# Patient Record
Sex: FEMALE | Race: WHITE | NOT HISPANIC OR LATINO | Employment: UNEMPLOYED | URBAN - NONMETROPOLITAN AREA
[De-identification: names, ages, dates, MRNs, and addresses within clinical notes are randomized per-mention and may not be internally consistent; named-entity substitution may affect disease eponyms.]

---

## 2019-05-13 ENCOUNTER — TELEPHONE (OUTPATIENT)
Dept: ENDOCRINOLOGY | Facility: CLINIC | Age: 29
End: 2019-05-13

## 2019-05-13 ENCOUNTER — OFFICE VISIT (OUTPATIENT)
Dept: ENDOCRINOLOGY | Facility: CLINIC | Age: 29
End: 2019-05-13

## 2019-05-13 ENCOUNTER — APPOINTMENT (OUTPATIENT)
Dept: LAB | Facility: HOSPITAL | Age: 29
End: 2019-05-13

## 2019-05-13 VITALS
HEART RATE: 88 BPM | HEIGHT: 66 IN | SYSTOLIC BLOOD PRESSURE: 116 MMHG | BODY MASS INDEX: 21.05 KG/M2 | WEIGHT: 131 LBS | DIASTOLIC BLOOD PRESSURE: 78 MMHG

## 2019-05-13 DIAGNOSIS — F17.200 SMOKING: ICD-10-CM

## 2019-05-13 DIAGNOSIS — R73.9 HYPERGLYCEMIA: ICD-10-CM

## 2019-05-13 DIAGNOSIS — R30.0 DYSURIA: ICD-10-CM

## 2019-05-13 DIAGNOSIS — IMO0002 DIABETES MELLITUS TYPE 2, UNCONTROLLED, WITH COMPLICATIONS: Primary | ICD-10-CM

## 2019-05-13 LAB
BACTERIA UR QL AUTO: ABNORMAL /HPF
BASOPHILS # BLD AUTO: 0.09 10*3/MM3 (ref 0–0.2)
BASOPHILS NFR BLD AUTO: 1.1 % (ref 0–1.5)
BILIRUB UR QL STRIP: NEGATIVE
CLARITY UR: ABNORMAL
COLOR UR: YELLOW
DEPRECATED RDW RBC AUTO: 37.3 FL (ref 37–54)
EOSINOPHIL # BLD AUTO: 0.29 10*3/MM3 (ref 0–0.4)
EOSINOPHIL NFR BLD AUTO: 3.5 % (ref 0.3–6.2)
ERYTHROCYTE [DISTWIDTH] IN BLOOD BY AUTOMATED COUNT: 11.6 % (ref 12.3–15.4)
GLUCOSE BLDC GLUCOMTR-MCNC: 176 MG/DL (ref 70–130)
GLUCOSE UR STRIP-MCNC: ABNORMAL MG/DL
HBA1C MFR BLD: 9.46 % (ref 4.8–5.6)
HCT VFR BLD AUTO: 44.6 % (ref 34–46.6)
HGB BLD-MCNC: 15.1 G/DL (ref 12–15.9)
HGB UR QL STRIP.AUTO: NEGATIVE
HYALINE CASTS UR QL AUTO: ABNORMAL /LPF
IMM GRANULOCYTES # BLD AUTO: 0.03 10*3/MM3 (ref 0–0.05)
IMM GRANULOCYTES NFR BLD AUTO: 0.4 % (ref 0–0.5)
KETONES UR QL STRIP: NEGATIVE
LEUKOCYTE ESTERASE UR QL STRIP.AUTO: ABNORMAL
LYMPHOCYTES # BLD AUTO: 2.24 10*3/MM3 (ref 0.7–3.1)
LYMPHOCYTES NFR BLD AUTO: 26.7 % (ref 19.6–45.3)
MCH RBC QN AUTO: 29.5 PG (ref 26.6–33)
MCHC RBC AUTO-ENTMCNC: 33.9 G/DL (ref 31.5–35.7)
MCV RBC AUTO: 87.3 FL (ref 79–97)
MONOCYTES # BLD AUTO: 0.56 10*3/MM3 (ref 0.1–0.9)
MONOCYTES NFR BLD AUTO: 6.7 % (ref 5–12)
NEUTROPHILS # BLD AUTO: 5.17 10*3/MM3 (ref 1.7–7)
NEUTROPHILS NFR BLD AUTO: 61.6 % (ref 42.7–76)
NITRITE UR QL STRIP: POSITIVE
NRBC BLD AUTO-RTO: 0 /100 WBC (ref 0–0.2)
PH UR STRIP.AUTO: 6 [PH] (ref 5–8)
PLATELET # BLD AUTO: 299 10*3/MM3 (ref 140–450)
PMV BLD AUTO: 12.3 FL (ref 6–12)
PROT UR QL STRIP: NEGATIVE
RBC # BLD AUTO: 5.11 10*6/MM3 (ref 3.77–5.28)
RBC # UR: ABNORMAL /HPF
REF LAB TEST METHOD: ABNORMAL
SP GR UR STRIP: 1.02 (ref 1–1.03)
SQUAMOUS #/AREA URNS HPF: ABNORMAL /HPF
UROBILINOGEN UR QL STRIP: ABNORMAL
WBC NRBC COR # BLD: 8.38 10*3/MM3 (ref 3.4–10.8)
WBC UR QL AUTO: ABNORMAL /HPF

## 2019-05-13 PROCEDURE — 36415 COLL VENOUS BLD VENIPUNCTURE: CPT | Performed by: NURSE PRACTITIONER

## 2019-05-13 PROCEDURE — 83525 ASSAY OF INSULIN: CPT | Performed by: NURSE PRACTITIONER

## 2019-05-13 PROCEDURE — 85025 COMPLETE CBC W/AUTO DIFF WBC: CPT | Performed by: NURSE PRACTITIONER

## 2019-05-13 PROCEDURE — 95250 CONT GLUC MNTR PHYS/QHP EQP: CPT | Performed by: NURSE PRACTITIONER

## 2019-05-13 PROCEDURE — 87077 CULTURE AEROBIC IDENTIFY: CPT | Performed by: NURSE PRACTITIONER

## 2019-05-13 PROCEDURE — 83036 HEMOGLOBIN GLYCOSYLATED A1C: CPT | Performed by: NURSE PRACTITIONER

## 2019-05-13 PROCEDURE — 87186 SC STD MICRODIL/AGAR DIL: CPT | Performed by: NURSE PRACTITIONER

## 2019-05-13 PROCEDURE — 81001 URINALYSIS AUTO W/SCOPE: CPT | Performed by: NURSE PRACTITIONER

## 2019-05-13 PROCEDURE — 87086 URINE CULTURE/COLONY COUNT: CPT | Performed by: NURSE PRACTITIONER

## 2019-05-13 PROCEDURE — 84206 ASSAY OF PROINSULIN: CPT | Performed by: NURSE PRACTITIONER

## 2019-05-13 PROCEDURE — 99204 OFFICE O/P NEW MOD 45 MIN: CPT | Performed by: NURSE PRACTITIONER

## 2019-05-13 RX ORDER — ALBUTEROL SULFATE 90 UG/1
AEROSOL, METERED RESPIRATORY (INHALATION) AS NEEDED
COMMUNITY
Start: 2019-05-03 | End: 2020-09-03 | Stop reason: SDUPTHER

## 2019-05-13 RX ORDER — GABAPENTIN 400 MG/1
CAPSULE ORAL AS NEEDED
COMMUNITY
Start: 2019-02-12 | End: 2022-05-04

## 2019-05-13 RX ORDER — FLUOXETINE HYDROCHLORIDE 20 MG/1
20 CAPSULE ORAL DAILY
Refills: 3 | COMMUNITY
Start: 2019-03-15 | End: 2021-10-11

## 2019-05-13 RX ORDER — DULAGLUTIDE 1.5 MG/.5ML
INJECTION, SOLUTION SUBCUTANEOUS
Refills: 1 | COMMUNITY
Start: 2019-04-17 | End: 2020-09-03 | Stop reason: SDUPTHER

## 2019-05-13 RX ORDER — INSULIN GLARGINE 100 [IU]/ML
INJECTION, SOLUTION SUBCUTANEOUS
COMMUNITY
Start: 2019-02-04 | End: 2019-05-28 | Stop reason: SDUPTHER

## 2019-05-13 NOTE — PROGRESS NOTES
Referring provider LILLIE Hopkins        Subjective    29 year old female presents for consultation    REASON - Diabetes Mellitus type 2 uncontrolled diagnosed in 2017      HPI      Duration/Timing -Diabetes Mellitus type 2 diagnosed in 2017/  Presented       Quality -not controlled       Severity - moderate       Severity (Complication/Hospitalizations)        Secondary Macrovascular - no CVA, no PAD, no CAD        Secondary Microvascular--- neuropathy      Context-- presented to ER for fatigue, difficulty waking up and blood sugar was over 700         Diabetes Regimen        Metformin bid    trulicity once weekly      basaglar     Novolog    Quantity      Last HgbA1c 10.2% from Jan. 2019       Blood Glucose Readings    Checks sugars 6 times daily and has been for the last 90 days        220 and above     She did have a a low after giving the fast acting insulin - low of 40        Diet-   Regular diet     Drinking regular sodas       Associated Signs/Symptoms       Hyperglycemic Symptoms: dry mouth       Hypoglycemic Episodes: after fast acting insulin, sweating, nausea      Review of Systems  Review of Systems   Constitutional: Positive for fatigue. Negative for activity change, appetite change, chills, diaphoresis, unexpected weight gain and unexpected weight loss.   HENT: Negative for congestion, dental problem, drooling, ear discharge, sore throat, tinnitus, trouble swallowing and voice change.    Eyes: Negative for blurred vision, double vision, photophobia, pain, discharge, redness, itching and visual disturbance.   Respiratory: Negative for apnea, cough, choking, chest tightness and shortness of breath.    Cardiovascular: Negative for chest pain, palpitations and leg swelling.   Gastrointestinal: Negative for abdominal distention, abdominal pain, blood in stool, constipation, diarrhea, nausea and vomiting.   Endocrine: Negative for cold intolerance, heat intolerance, polydipsia, polyphagia and  polyuria.   Genitourinary: Negative for decreased libido, decreased urine volume, difficulty urinating, dysuria, frequency and urgency.   Musculoskeletal: Negative for arthralgias, back pain, gait problem, joint swelling, myalgias, neck pain, neck stiffness and bursitis.   Skin: Negative for color change, dry skin, pallor, rash and bruise.   Allergic/Immunologic: Negative for environmental allergies, food allergies and immunocompromised state.   Neurological: Negative for dizziness, tremors, syncope, facial asymmetry, weakness, numbness, memory problem and confusion.   Hematological: Negative for adenopathy. Does not bruise/bleed easily.   Psychiatric/Behavioral: Negative for agitation, behavioral problems, decreased concentration, sleep disturbance, suicidal ideas, depressed mood and stress. The patient is not nervous/anxious.      Wt Readings from Last 3 Encounters:   05/13/19 59.4 kg (131 lb)   09/08/16 72.1 kg (159 lb)     Temp Readings from Last 3 Encounters:   No data found for Temp     BP Readings from Last 3 Encounters:   05/13/19 116/78   09/08/16 102/70     Pulse Readings from Last 3 Encounters:   05/13/19 88         Physical Exam  Physical Exam   Constitutional: She is oriented to person, place, and time. She appears well-developed and well-nourished. No distress.   HENT:   Head: Normocephalic and atraumatic.   Right Ear: External ear normal.   Left Ear: External ear normal.   Nose: Nose normal.   Eyes: Conjunctivae and EOM are normal. Pupils are equal, round, and reactive to light.   Neck: Normal range of motion. Neck supple. No tracheal deviation present. No thyromegaly present.   Cardiovascular: Normal rate, regular rhythm and normal heart sounds.   No murmur heard.  Pulmonary/Chest: Effort normal and breath sounds normal. No respiratory distress. She has no wheezes.   Abdominal: Soft. Bowel sounds are normal. There is no tenderness. There is no rebound and no guarding.   Musculoskeletal: Normal range  of motion. She exhibits no edema, tenderness or deformity.   Neurological: She is alert and oriented to person, place, and time. No cranial nerve deficit.   Skin: Skin is warm and dry. No rash noted.   Psychiatric: She has a normal mood and affect. Her behavior is normal. Judgment and thought content normal.         Assessment/Plan       Diagnosis Plan   1. Diabetes mellitus type 2, uncontrolled, with complications (CMS/[facility])  CBC & Differential    Comprehensive Metabolic Panel    Hemoglobin A1c    Islet Cell Dysfunction Group 1    Anti-islet Cell Antibody    Glutamic Acid Decarboxylase    POC Glucose Fingerstick    CBC Auto Differential   2. Hyperglycemia  CBC & Differential    Comprehensive Metabolic Panel    Hemoglobin A1c    Islet Cell Dysfunction Group 1    Anti-islet Cell Antibody    Glutamic Acid Decarboxylase    CBC Auto Differential   3. Dysuria  Urinalysis With Culture If Indicated - Urine, Clean Catch   4. Smoking             Glycemic Management    Checking 6 times daily     10.2 % from Jan. 2019       metformin 500 mg two po BID   Trulicity 1.5 mg weekly    Basaglar taking 18 units --keep         Novolog sliding scale---stop      Start novolog as 1 unit per 15 grams of CHO for each meal TID        plus sliding scale 1 per 50 above 150      Check to see if type 1 due to sensitivity to insulin and her weight --- will keep the metformin and Trulicity until labs are back         Uncontrolled diabetes  Hyperglycemia    Insertion of continuous glucose monitor to define patter    The continuous glucose monitor that was inserted is a savannah glucose monitor          approve dexcom       1. Patient is diabetic, insulin dependent  2. He checks his sugars 4 times daily with variability from 50 up to 400  3, requires basal insulin and prandial insulin 3 times daily for a total of 4 injections per day  4. Based on glucose readings we make adjustments to the insulin  5. We have not achieved ideal outcomes with more  information with a continuous  glucose sensor we should be able to do so  6. We see him every 2 to 3 months for diabetes management  7. Patient has hypoglycemia with unawareness  8. Patient has completed diabetes education  9. Patient has day to day variation in mealtime which confounds the degree of insulin dosing with multiple injections unless we have the CGMS that allows us to better  insulin dosing         Lipid Management      Patient is not on a statin       Jan. 2019       Total chol - 178  Tg - 98  HDL - 36  LDL - 122        Blood Pressure Management    bp controlled not on ACEi           Microvascular Complication Monitoring      May 2018       microalbuminuria     Ratio - 33 High           Gabapentin 400 mg     Bone Health      May 2018     Vitamin d 31         Other Diabetes Related Aspects          Thyroid Health      May 2018    TSH - 1.13       Weight Management:      Patient's Body mass index is 21.14 kg/m². BMI is within normal parameters. No follow-up required..        Preventive Care:    Patient is smoking       I advised Ella of the risks of continuing to use tobacco, and I provided her with tobacco cessation educational materials in the After Visit Summary.     During this visit, I spent less than 3  minutes counseling the patient regarding tobacco cessation.      Dysuria     Check u/a today         Return in about 2 weeks (around 5/27/2019) for Recheck.       Labs today and I will call        Records from Mrs. Norwood received and reviewed from 2019  Thank you for this consultation

## 2019-05-14 ENCOUNTER — TELEPHONE (OUTPATIENT)
Dept: ENDOCRINOLOGY | Facility: CLINIC | Age: 29
End: 2019-05-14

## 2019-05-14 DIAGNOSIS — IMO0002 DIABETES MELLITUS TYPE 2, UNCONTROLLED, WITH COMPLICATIONS: Primary | ICD-10-CM

## 2019-05-14 RX ORDER — SULFAMETHOXAZOLE AND TRIMETHOPRIM 800; 160 MG/1; MG/1
1 TABLET ORAL 2 TIMES DAILY
Qty: 10 TABLET | Refills: 0 | Status: SHIPPED | OUTPATIENT
Start: 2019-05-14 | End: 2020-09-03 | Stop reason: SDUPTHER

## 2019-05-14 NOTE — TELEPHONE ENCOUNTER
----- Message from LILLIE Esquivel sent at 5/14/2019  7:54 AM CDT -----  Let her know the urine is positive for infection; I called in an RX for this ((( please call the lab and see if they can add a cpeptide to her blood if not she needs to go to [place] to have drawn)

## 2019-05-15 LAB — BACTERIA SPEC AEROBE CULT: ABNORMAL

## 2019-05-17 ENCOUNTER — APPOINTMENT (OUTPATIENT)
Dept: LAB | Facility: HOSPITAL | Age: 29
End: 2019-05-17

## 2019-05-17 PROCEDURE — 80053 COMPREHEN METABOLIC PANEL: CPT | Performed by: NURSE PRACTITIONER

## 2019-05-17 PROCEDURE — 84681 ASSAY OF C-PEPTIDE: CPT | Performed by: NURSE PRACTITIONER

## 2019-05-17 PROCEDURE — 86341 ISLET CELL ANTIBODY: CPT | Performed by: NURSE PRACTITIONER

## 2019-05-18 LAB
ALBUMIN SERPL-MCNC: 4.7 G/DL (ref 3.5–5.2)
ALBUMIN/GLOB SERPL: 1.8 G/DL
ALP SERPL-CCNC: 73 U/L (ref 39–117)
ALT SERPL W P-5'-P-CCNC: 11 U/L (ref 1–33)
ANION GAP SERPL CALCULATED.3IONS-SCNC: 11.8 MMOL/L
AST SERPL-CCNC: 16 U/L (ref 1–32)
BILIRUB SERPL-MCNC: 0.4 MG/DL (ref 0.2–1.2)
BUN BLD-MCNC: 12 MG/DL (ref 6–20)
BUN/CREAT SERPL: 16.7 (ref 7–25)
CALCIUM SPEC-SCNC: 9.2 MG/DL (ref 8.6–10.5)
CHLORIDE SERPL-SCNC: 105 MMOL/L (ref 98–107)
CO2 SERPL-SCNC: 25.2 MMOL/L (ref 22–29)
CREAT BLD-MCNC: 0.72 MG/DL (ref 0.57–1)
GFR SERPL CREATININE-BSD FRML MDRD: 94 ML/MIN/1.73
GLOBULIN UR ELPH-MCNC: 2.6 GM/DL
GLUCOSE BLD-MCNC: 203 MG/DL (ref 65–99)
POTASSIUM BLD-SCNC: 3.9 MMOL/L (ref 3.5–5.2)
PROT SERPL-MCNC: 7.3 G/DL (ref 6–8.5)
SODIUM BLD-SCNC: 142 MMOL/L (ref 136–145)

## 2019-05-19 LAB
INSULIN SERPL-ACNC: 32 UIU/ML
PROINSULIN SERPL-SCNC: 13 PMOL/L
PROINSULIN/INSULIN SERPL-RTO: 6 %

## 2019-05-20 ENCOUNTER — TELEPHONE (OUTPATIENT)
Dept: ENDOCRINOLOGY | Facility: CLINIC | Age: 29
End: 2019-05-20

## 2019-05-20 LAB — C PEPTIDE SERPL-MCNC: 0.6 NG/ML (ref 1.1–4.4)

## 2019-05-20 NOTE — TELEPHONE ENCOUNTER
----- Message from LILLIE Esquivel sent at 5/20/2019  2:39 PM CDT -----  She is type 1 diabetic keep the 2 insulins but stop the metformin and trulicity

## 2019-05-21 LAB — PANC ISLET CELL AB TITR SER: NEGATIVE {TITER}

## 2019-05-23 LAB — GAD65 AB SER-ACNC: 2607.1 U/ML (ref 0–5)

## 2019-05-28 ENCOUNTER — TELEPHONE (OUTPATIENT)
Dept: ENDOCRINOLOGY | Facility: CLINIC | Age: 29
End: 2019-05-28

## 2019-05-28 ENCOUNTER — OFFICE VISIT (OUTPATIENT)
Dept: ENDOCRINOLOGY | Facility: CLINIC | Age: 29
End: 2019-05-28

## 2019-05-28 DIAGNOSIS — E10.65 TYPE 1 DIABETES MELLITUS WITH HYPERGLYCEMIA (HCC): Primary | ICD-10-CM

## 2019-05-28 PROCEDURE — 95251 CONT GLUC MNTR ANALYSIS I&R: CPT | Performed by: NURSE PRACTITIONER

## 2019-05-28 RX ORDER — INSULIN LISPRO 100 U/ML
INJECTION, SOLUTION SUBCUTANEOUS
Qty: 5 PEN | Refills: 5 | Status: SHIPPED | OUTPATIENT
Start: 2019-05-28 | End: 2019-10-18 | Stop reason: CLARIF

## 2019-05-28 RX ORDER — INSULIN GLARGINE 100 [IU]/ML
INJECTION, SOLUTION SUBCUTANEOUS
Qty: 5 PEN | Refills: 5 | Status: SHIPPED | OUTPATIENT
Start: 2019-05-28 | End: 2021-03-03

## 2019-05-28 NOTE — TELEPHONE ENCOUNTER
----- Message from Cami Candelario RN sent at 5/28/2019 12:04 PM CDT -----  Regarding: Needs precription  Basaglar 20 un. Daily  Novolog 1:10g plus sliding scale 1:50 above 150.    Patient needs both basaglar and novolog prescriptions sent in to [facility] in [place].

## 2019-05-28 NOTE — PROGRESS NOTES
Ella Olivera is a 29 y.o. female seen by diabetes educator 05/28/2019  to remove diagnostic Jason sensor. Completed sensor download. Reviewed with LILLIE Gomez. Avg. Glucose 195. In Target range 41%, above 56%, hypoglycemia 3%. Changed basaglar dose to 20 units daily and carb ratio 1:10 with same sliding scale - see below.     1. Carbohydrate Counting/ Exchange Choices and Healthy Foods   I. Reviewed carbohydrate-containing foods, standard serving sizes, how to measure foods, and reading nutrition labels.   II. Provided patient with carbohydrate counting booklet (Carb counting and meal planning book).   III. Provided patient with list of non-starchy vegetables and foods that are low in carbohydrate for snacks and to incorporate with meals.     IV. Instructed patient to eat 45-60 grams of carbohydrate with each meal. Patient stated trying to gain a little weight so will try to 60 grams.     V. Reviewed the difference between simple and complex carbohydrate. Encouraged patient to choose complex carbohydrates more often.     VI. Choose fruits, vegetables, whole grains, legumes, low-fat milk, fiber-rich foods, minimal saturated fats, and watch cholesterol and sodium intake.    VII. Patient demonstrated understanding by correctly identifying and calculating carbohydrate amounts for various meals.    2. Novolog Dosing   I. 1 units of Humalog for every 15 grams of carbohydrate eaten plus 1 units per 50 mg/dl above 150 sliding scale. Change to 1:10 carb ratio, same sliding scale.    II. Patient demonstrated understanding by calculating correct dosage for example meals and blood sugars   III. Reviewed Novolog is to be given anytime you eat food with carbs in it or for high sugars.     3. Basaglar Dosing   I. 18 units in the morning. Change to 20 units daily. Patient decided better to give at night. Reviewed steps to change times of day.      [facility]    5. Reviewed proper  treatment for hypoglycemia, hyperglycemia, and DKA. Diabetes and You book provided. Reviewed difference between long-acting and fast-acting insulin. Patient mentioned some interest in insulin pump. Review with Trey at upcoming appointment.     Keep follow up appt on 6/24/19.  CAMILLE JasonN, RN  Diabetes Educator

## 2019-05-29 ENCOUNTER — TELEPHONE (OUTPATIENT)
Dept: ENDOCRINOLOGY | Facility: CLINIC | Age: 29
End: 2019-05-29

## 2019-05-29 NOTE — TELEPHONE ENCOUNTER
Pt has got a yeast Infection from  [facility] and now has yeast infections    Pharmacy [place], [place]    [phone]

## 2019-05-30 RX ORDER — FLUCONAZOLE 150 MG/1
150 TABLET ORAL ONCE
Qty: 1 TABLET | Refills: 1 | Status: SHIPPED | OUTPATIENT
Start: 2019-05-30 | End: 2019-05-30

## 2019-06-06 ENCOUNTER — TELEPHONE (OUTPATIENT)
Dept: ENDOCRINOLOGY | Facility: CLINIC | Age: 29
End: 2019-06-06

## 2019-07-01 ENCOUNTER — TELEPHONE (OUTPATIENT)
Dept: FAMILY MEDICINE CLINIC | Facility: CLINIC | Age: 29
End: 2019-07-01

## 2019-07-01 NOTE — TELEPHONE ENCOUNTER
Pt just found out she was pregnant, she has an appt with Trey on 08/07/2019. She is wanting to know if she needs to remove her Dexcom or if it is okay to keep it in. Please call pt, thanks!        Can you please answer this for her.  Thanks. r

## 2019-07-01 NOTE — TELEPHONE ENCOUNTER
Patient confirmed multiple urine pregnancy tests are positive from yesterday and today. Continue to wear dexcom. First trimester hormones may cause low blood sugars - keep close watch on blood sugar levels. Add in 2-3 snacks with pregnancy. Patient mentioned low sugars overnight. Recommended to eat a bedtime snack and not give insulin for that. If that doesn't work change carb ratio at supper from 1:10 to 1:15. Patient will call for an OB appt. Interested in pumps - explained 2 office notes needed for pump approval and only seen NP here once.

## 2019-07-01 NOTE — TELEPHONE ENCOUNTER
Pt just found out she was pregnant, she has an appt with Trey on 08/07/2019. She is wanting to know if she needs to remove her Dexcom or if it is okay to keep it in. Please call pt, thanks!

## 2019-09-17 ENCOUNTER — OFFICE VISIT (OUTPATIENT)
Dept: ENDOCRINOLOGY | Facility: CLINIC | Age: 29
End: 2019-09-17

## 2019-09-17 ENCOUNTER — APPOINTMENT (OUTPATIENT)
Dept: LAB | Facility: HOSPITAL | Age: 29
End: 2019-09-17

## 2019-09-17 VITALS
BODY MASS INDEX: 22.87 KG/M2 | HEART RATE: 66 BPM | WEIGHT: 142.3 LBS | OXYGEN SATURATION: 99 % | HEIGHT: 66 IN | DIASTOLIC BLOOD PRESSURE: 76 MMHG | SYSTOLIC BLOOD PRESSURE: 116 MMHG

## 2019-09-17 DIAGNOSIS — F17.200 SMOKING: ICD-10-CM

## 2019-09-17 DIAGNOSIS — E10.65 TYPE 1 DIABETES MELLITUS WITH HYPERGLYCEMIA (HCC): Primary | ICD-10-CM

## 2019-09-17 PROCEDURE — 99214 OFFICE O/P EST MOD 30 MIN: CPT | Performed by: NURSE PRACTITIONER

## 2019-09-17 PROCEDURE — 83036 HEMOGLOBIN GLYCOSYLATED A1C: CPT | Performed by: NURSE PRACTITIONER

## 2019-09-17 PROCEDURE — 85025 COMPLETE CBC W/AUTO DIFF WBC: CPT | Performed by: NURSE PRACTITIONER

## 2019-09-17 PROCEDURE — 84443 ASSAY THYROID STIM HORMONE: CPT | Performed by: NURSE PRACTITIONER

## 2019-09-17 PROCEDURE — 82306 VITAMIN D 25 HYDROXY: CPT | Performed by: NURSE PRACTITIONER

## 2019-09-17 PROCEDURE — 36415 COLL VENOUS BLD VENIPUNCTURE: CPT | Performed by: NURSE PRACTITIONER

## 2019-09-17 PROCEDURE — 80053 COMPREHEN METABOLIC PANEL: CPT | Performed by: NURSE PRACTITIONER

## 2019-09-17 NOTE — PROGRESS NOTES
Subjective    Ella Olivera is a 29 y.o. female. she is here today for follow-up.    History of Present Illness     29 year old female presents for follow up          REASON - Diabetes Mellitus type 1         HPI        Duration/Timing -Diabetes Mellitus type 1 diagnosed in 2017        Quality -not controlled         Severity - moderate          Severity (Complication/Hospitalizations)        Secondary Macrovascular - no CVA, no PAD, no CAD        Secondary Microvascular--- neuropathy        Context-- presented to ER for fatigue, difficulty waking up and blood sugar was over 700          Diabetes Regimen           Metformin bid     trulicity once weekly        basaglar      Novolog     Quantity      Lab Results   Component Value Date    HGBA1C 9.46 (H) 05/13/2019             Blood Glucose Readings     Checks sugars 6 times daily and has been for the last 90 days     Has the dexcom         Had some soda before visit            Diet-   Regular diet      Drinking regular sodas         Associated Signs/Symptoms       Hyperglycemic Symptoms: dry mouth       Hypoglycemic Episodes: after fast acting insulin, sweating, nausea               The following portions of the patient's history were reviewed and updated as appropriate:   Past Medical History:   Diagnosis Date   • Allergic rhinitis    • Backache    • Cyst of ovary    • Endometriosis     clinical   • Endometriosis of pelvic peritoneum     h/o   • Genital warts     h/o   • Irregular periods    • Left lower quadrant pain    • Pelvic and perineal pain     has bladder tenderness, uterine tenderness, and left adnexal tenderness   • Urethritis    • Urinary tract infectious disease      Past Surgical History:   Procedure Laterality Date   • APPENDECTOMY     • CHOLECYSTECTOMY     • DENTAL PROCEDURE      wisdom teeth extraction   • DIAGNOSTIC LAPAROSCOPY  12/27/2007    exam under anesthesia, diagnostic laparoscopy. lower abdominal pain of uncertain etiology. stage  II endometriosis.   • TONSILLECTOMY  2002    chronic tonsillitis     Family History   Problem Relation Age of Onset   • Diabetes Other    • Hypertension Other      OB History      Para Term  AB Living    6 1   1 5 1    SAB TAB Ectopic Molar Multiple Live Births    5                  Current Outpatient Medications   Medication Sig Dispense Refill   • ADMELOG SOLOSTAR 100 UNIT/ML solution pen-injector Inject 10-12 units per carb intake-carb count each meal 5 pen 5   • FLUoxetine (PROzac) 20 MG capsule Take 20 mg by mouth Daily.  3   • gabapentin (NEURONTIN) 400 MG capsule      • insulin aspart (NOVOLOG FLEXPEN) 100 UNIT/ML solution pen-injector sc pen Inject 10-12 units based on carb count with each meal. 5 pen 5   • Insulin Glargine (BASAGLAR KWIKPEN) 100 UNIT/ML injection pen Inject 20 units per day. 5 pen 5   • metFORMIN (GLUCOPHAGE) 500 MG tablet TAKE 1 TABLET BY MOUTH EVERY DAY WITH MEALS  0   • sulfamethoxazole-trimethoprim (BACTRIM DS) 800-160 MG per tablet Take 1 tablet by mouth 2 (Two) Times a Day. 10 tablet 0   • TRULICITY 1.5 MG/0.5ML solution pen-injector INJECT 1.5 MG (0.5 ML) SUBCUTANEOUSLY ONCE A WEEK AS DIRECTED  1   • VENTOLIN  (90 Base) MCG/ACT inhaler        No current facility-administered medications for this visit.      Allergies   Allergen Reactions   • Aspirin    • Penicillins    • Reglan [Metoclopramide]      Social History     Socioeconomic History   • Marital status:      Spouse name: Not on file   • Number of children: Not on file   • Years of education: Not on file   • Highest education level: Not on file   Tobacco Use   • Smoking status: Current Every Day Smoker   • Smokeless tobacco: Never Used   Substance and Sexual Activity   • Alcohol use: No   • Drug use: Yes     Comment: past use   • Sexual activity: Yes     Partners: Male     Birth control/protection: None       Review of Systems  Review of Systems   Constitutional: Negative for activity change,  "appetite change, diaphoresis and fatigue.   HENT: Negative for facial swelling, sneezing, sore throat, tinnitus, trouble swallowing and voice change.    Eyes: Negative for photophobia, pain, discharge, redness, itching and visual disturbance.   Respiratory: Negative for apnea, cough, choking, chest tightness and shortness of breath.    Cardiovascular: Negative for chest pain, palpitations and leg swelling.   Gastrointestinal: Negative for abdominal distention, abdominal pain, constipation, diarrhea, nausea and vomiting.   Endocrine: Negative for cold intolerance, heat intolerance, polydipsia, polyphagia and polyuria.   Genitourinary: Negative for difficulty urinating, dysuria, frequency, hematuria and urgency.   Musculoskeletal: Negative for arthralgias, back pain, gait problem, joint swelling, myalgias, neck pain and neck stiffness.   Skin: Negative for color change, pallor, rash and wound.   Neurological: Negative for dizziness, tremors, weakness, light-headedness, numbness and headaches.   Hematological: Negative for adenopathy. Does not bruise/bleed easily.   Psychiatric/Behavioral: Negative for behavioral problems, confusion and sleep disturbance.        Objective    /76   Pulse 66   Ht 167.6 cm (66\")   Wt 64.5 kg (142 lb 4.8 oz)   SpO2 99%   BMI 22.97 kg/m²   Physical Exam   Constitutional: She is oriented to person, place, and time. She appears well-developed and well-nourished. No distress.   HENT:   Head: Normocephalic and atraumatic.   Right Ear: External ear normal.   Left Ear: External ear normal.   Nose: Nose normal.   Eyes: Conjunctivae and EOM are normal. Pupils are equal, round, and reactive to light.   Neck: Normal range of motion. Neck supple. No tracheal deviation present. No thyromegaly present.   Cardiovascular: Normal rate, regular rhythm and normal heart sounds.   No murmur heard.  Pulmonary/Chest: Effort normal and breath sounds normal. No respiratory distress. She has no wheezes. "   Abdominal: Soft. Bowel sounds are normal. There is no tenderness. There is no rebound and no guarding.   Musculoskeletal: Normal range of motion. She exhibits no edema, tenderness or deformity.   Neurological: She is alert and oriented to person, place, and time. No cranial nerve deficit.   Skin: Skin is warm and dry. No rash noted.   Psychiatric: She has a normal mood and affect. Her behavior is normal. Judgment and thought content normal.       Lab Review  Glucose (mg/dL)   Date Value   05/17/2019 203 (H)     Sodium (mmol/L)   Date Value   05/17/2019 142     Potassium (mmol/L)   Date Value   05/17/2019 3.9     Chloride (mmol/L)   Date Value   05/17/2019 105     CO2 (mmol/L)   Date Value   05/17/2019 25.2     BUN (mg/dL)   Date Value   05/17/2019 12     Creatinine (mg/dL)   Date Value   05/17/2019 0.72     Hemoglobin A1C (%)   Date Value   05/13/2019 9.46 (H)       Assessment/Plan      1. Type 1 diabetes mellitus with hyperglycemia (CMS/[facility])    2. Smoking    .    Medications prescribed:  Outpatient Encounter Medications as of 9/17/2019   Medication Sig Dispense Refill   • ADMELOG SOLOSTAR 100 UNIT/ML solution pen-injector Inject 10-12 units per carb intake-carb count each meal 5 pen 5   • FLUoxetine (PROzac) 20 MG capsule Take 20 mg by mouth Daily.  3   • gabapentin (NEURONTIN) 400 MG capsule      • insulin aspart (NOVOLOG FLEXPEN) 100 UNIT/ML solution pen-injector sc pen Inject 10-12 units based on carb count with each meal. 5 pen 5   • Insulin Glargine (BASAGLAR KWIKPEN) 100 UNIT/ML injection pen Inject 20 units per day. 5 pen 5   • metFORMIN (GLUCOPHAGE) 500 MG tablet TAKE 1 TABLET BY MOUTH EVERY DAY WITH MEALS  0   • sulfamethoxazole-trimethoprim (BACTRIM DS) 800-160 MG per tablet Take 1 tablet by mouth 2 (Two) Times a Day. 10 tablet 0   • TRULICITY 1.5 MG/0.5ML solution pen-injector INJECT 1.5 MG (0.5 ML) SUBCUTANEOUSLY ONCE A WEEK AS DIRECTED  1   • VENTOLIN  (90 Base) MCG/ACT inhaler        No  facility-administered encounter medications on file as of 9/17/2019.        Orders placed during this encounter include:  Orders Placed This Encounter   Procedures   • TSH   • Hemoglobin A1c   • Comprehensive Metabolic Panel   • Vitamin D 25 Hydroxy   • CBC & Differential     Order Specific Question:   Manual Differential     Answer:   No     Glycemic Management           Lab Results   Component Value Date    HGBA1C 9.46 (H) 05/13/2019          Component      Latest Ref Rng & Units 5/17/2019   Islet Cell Ab      Neg:<1:1 Negative   BRENDA-65      0.0 - 5.0 U/mL 2607.1 (H)   C-Peptide      1.1 - 4.4 ng/mL 0.6 (L)        metformin 500 mg two po BID --- stopped   Trulicity 1.5 mg weekly -- stopped      Basaglar taking 20 units --- decrease to 18 untis            Novolog       1 unit per 10 grams of CHO          plus sliding scale 1 per 50 above 150               Approve for  Insulin pump      #1  Patient has diabetes mellitus, insulin-dependent.     #2 She performs blood glucose testing at least times daily and blood glucose log was brought to office with variability from .     #3  She is injects  Basal insulin  and Prandial Insulin for a total of at least  4 injections per day and has been doing this for at least 6 months      #4 Patient tests blood sugars at least 4 times daily and makes frequent self-adjustments and patient is injecting insulin at least 4 times daily. She has been doing this for more than 6 months . She tests frequently due to hypoglycemia and hyperglycemia.      #5 I have personally seen patient within the past 6 months     #6 We plan on seeing her every 2-3 months for continuous adjustment of her diabetes regimen      #7 patient has hypoglycemia with episodes of unawareness.     #8 patient has day-to-day variation in her mealtime which confounds the degree of insulin dosing with multiple daily injections.     #9 patient has completed diabetes education program with us.     #10 she has  demonstrated the ability to self monitor her glucose.         #11 Patient is motivated in improving  diabetes control              Lipid Management        Patient is not on a statin         Jan. 2019         Total chol - 178  Tg - 98  HDL - 36  LDL - 122           Blood Pressure Management     bp controlled not on ACEi               Microvascular Complication Monitoring        May 2018         microalbuminuria      Ratio - 33 High               Gabapentin 400 mg      Bone Health        May 2018      Vitamin d 31            Other Diabetes Related Aspects              Thyroid Health        May 2018     TSH - 1.13         Weight Management:     Patient's Body mass index is 22.97 kg/m². BMI is within normal parameters. No follow-up required..    Decrease daily caloric intake by 500 calories per day            Preventive Care:     Patient is smoking     Ella Chery Olivera is a current cigarettes user.  She currently smokes 10 pack of cigarettes and cigarettes per day for a duration of 20 years. I have educated her on the risk of diseases from using tobacco products such as cancer, COPD and heart diease.     I advised her to quit and she is not willing to quit.    I spent 3  minutes counseling the patient.            .        4. Follow-up: Return in about 3 months (around 12/17/2019) for Recheck.

## 2019-09-18 LAB
25(OH)D3 SERPL-MCNC: 35.7 NG/ML (ref 30–100)
ALBUMIN SERPL-MCNC: 4.6 G/DL (ref 3.5–5.2)
ALBUMIN/GLOB SERPL: 1.8 G/DL
ALP SERPL-CCNC: 65 U/L (ref 39–117)
ALT SERPL W P-5'-P-CCNC: 13 U/L (ref 1–33)
ANION GAP SERPL CALCULATED.3IONS-SCNC: 11.6 MMOL/L (ref 5–15)
AST SERPL-CCNC: 21 U/L (ref 1–32)
BASOPHILS # BLD AUTO: 0.12 10*3/MM3 (ref 0–0.2)
BASOPHILS NFR BLD AUTO: 0.9 % (ref 0–1.5)
BILIRUB SERPL-MCNC: 0.3 MG/DL (ref 0.2–1.2)
BUN BLD-MCNC: 14 MG/DL (ref 6–20)
BUN/CREAT SERPL: 18.9 (ref 7–25)
CALCIUM SPEC-SCNC: 8.9 MG/DL (ref 8.6–10.5)
CHLORIDE SERPL-SCNC: 102 MMOL/L (ref 98–107)
CO2 SERPL-SCNC: 25.4 MMOL/L (ref 22–29)
CREAT BLD-MCNC: 0.74 MG/DL (ref 0.57–1)
DEPRECATED RDW RBC AUTO: 39.5 FL (ref 37–54)
EOSINOPHIL # BLD AUTO: 0.49 10*3/MM3 (ref 0–0.4)
EOSINOPHIL NFR BLD AUTO: 3.5 % (ref 0.3–6.2)
ERYTHROCYTE [DISTWIDTH] IN BLOOD BY AUTOMATED COUNT: 11.9 % (ref 12.3–15.4)
GFR SERPL CREATININE-BSD FRML MDRD: 92 ML/MIN/1.73
GLOBULIN UR ELPH-MCNC: 2.6 GM/DL
GLUCOSE BLD-MCNC: 182 MG/DL (ref 65–99)
HBA1C MFR BLD: 8.9 % (ref 4.8–5.6)
HCT VFR BLD AUTO: 43.3 % (ref 34–46.6)
HGB BLD-MCNC: 14.2 G/DL (ref 12–15.9)
IMM GRANULOCYTES # BLD AUTO: 0.07 10*3/MM3 (ref 0–0.05)
IMM GRANULOCYTES NFR BLD AUTO: 0.5 % (ref 0–0.5)
LYMPHOCYTES # BLD AUTO: 2.45 10*3/MM3 (ref 0.7–3.1)
LYMPHOCYTES NFR BLD AUTO: 17.7 % (ref 19.6–45.3)
MCH RBC QN AUTO: 29.6 PG (ref 26.6–33)
MCHC RBC AUTO-ENTMCNC: 32.8 G/DL (ref 31.5–35.7)
MCV RBC AUTO: 90.4 FL (ref 79–97)
MONOCYTES # BLD AUTO: 0.58 10*3/MM3 (ref 0.1–0.9)
MONOCYTES NFR BLD AUTO: 4.2 % (ref 5–12)
NEUTROPHILS # BLD AUTO: 10.1 10*3/MM3 (ref 1.7–7)
NEUTROPHILS NFR BLD AUTO: 73.2 % (ref 42.7–76)
NRBC BLD AUTO-RTO: 0 /100 WBC (ref 0–0.2)
PLATELET # BLD AUTO: 361 10*3/MM3 (ref 140–450)
PMV BLD AUTO: 11.4 FL (ref 6–12)
POTASSIUM BLD-SCNC: 4 MMOL/L (ref 3.5–5.2)
PROT SERPL-MCNC: 7.2 G/DL (ref 6–8.5)
RBC # BLD AUTO: 4.79 10*6/MM3 (ref 3.77–5.28)
SODIUM BLD-SCNC: 139 MMOL/L (ref 136–145)
TSH SERPL DL<=0.05 MIU/L-ACNC: 0.75 UIU/ML (ref 0.27–4.2)
WBC NRBC COR # BLD: 13.81 10*3/MM3 (ref 3.4–10.8)

## 2019-09-23 ENCOUNTER — TELEPHONE (OUTPATIENT)
Dept: ENDOCRINOLOGY | Facility: CLINIC | Age: 29
End: 2019-09-23

## 2019-10-18 ENCOUNTER — TELEPHONE (OUTPATIENT)
Dept: FAMILY MEDICINE CLINIC | Facility: CLINIC | Age: 29
End: 2019-10-18

## 2019-10-18 NOTE — TELEPHONE ENCOUNTER
Pt has received her pump. She is needing the insulin called in to [facility] in [place]. Thanks!

## 2019-11-04 ENCOUNTER — TELEPHONE (OUTPATIENT)
Dept: FAMILY MEDICINE CLINIC | Facility: CLINIC | Age: 29
End: 2019-11-04

## 2019-11-04 NOTE — TELEPHONE ENCOUNTER
She said she is having to give more insulin with meals and snacks. She is giving 7 units sometimes and use to not have to give any depending on what it was. She is going to follow up with PCP and get the UTI taken care.

## 2019-11-04 NOTE — TELEPHONE ENCOUNTER
PT CALLED AND STATES THAT SHE IS CONFUSED. SHE STATES THAT HER PHARMACY IS STATING HER INSULIN FOR HER PUMP IS NOT COVERED. SHE STATES THEY TOLD HER THEY HAD SENT SOMETHING TO THE OFFICE LAST WEEK FOR THIS. CAN YOU CHECK AND SEE IF YOU RECEIVED ANYTHING FROM [facility]? CALL HER [phone]    ALSO SHE STATES SHE BELIEVES SHE HAS A UTI AND IT IS CAUSING HER SUGARS TO RUN HIGH. SHE IS ASKING WHAT SHE NEEDS TO DO?

## 2019-11-04 NOTE — TELEPHONE ENCOUNTER
Please check on her insulin and see what is going on     If she has a UTI she needs to go to her doctor or urgent care to give a sample and let them treat her infection    How much insulin is she taking?

## 2019-11-04 NOTE — TELEPHONE ENCOUNTER
Pt called-      (SHE STATES SHE BELIEVES SHE HAS A UTI AND IT IS CAUSING HER SUGARS TO RUN HIGH. SHE IS ASKING WHAT SHE NEEDS TO DO?)

## 2019-11-05 ENCOUNTER — TELEPHONE (OUTPATIENT)
Dept: ENDOCRINOLOGY | Facility: CLINIC | Age: 29
End: 2019-11-05

## 2019-11-05 NOTE — TELEPHONE ENCOUNTER
GENESIS SERRA (Key: [id number])   Rx #: [id number]   NovoLOG 100UNIT/ML solution   Form  [employer] [place] [employer] [facility] Prior Authorization Form   Plan Contact  [phone] phone   [phone] fax   Created   4 days ago   Sent to Plan   1 minute ago  Determination   N/A   Wait for Determination  Please wait for [employer] to return a determination.

## 2019-12-05 ENCOUNTER — TELEPHONE (OUTPATIENT)
Dept: FAMILY MEDICINE CLINIC | Facility: CLINIC | Age: 29
End: 2019-12-05
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
## 2019-12-12 ENCOUNTER — TELEPHONE (OUTPATIENT)
Dept: ENDOCRINOLOGY | Facility: CLINIC | Age: 29
End: 2019-12-12

## 2019-12-13 ENCOUNTER — TELEPHONE (OUTPATIENT)
Dept: ENDOCRINOLOGY | Facility: CLINIC | Age: 29
End: 2019-12-13

## 2019-12-13 ENCOUNTER — TELEPHONE (OUTPATIENT)
Dept: FAMILY MEDICINE CLINIC | Facility: CLINIC | Age: 29
End: 2019-12-13

## 2019-12-13 NOTE — TELEPHONE ENCOUNTER
[employer] Needs to know last visit and insulin dependency and how many times they test. Paper work will be faxed please be sure that the DR signs this will be for diabetic supplies said alll numbers will be included in the fax  Thank You

## 2020-01-07 ENCOUNTER — TELEPHONE (OUTPATIENT)
Dept: FAMILY MEDICINE CLINIC | Facility: CLINIC | Age: 30
End: 2020-01-07

## 2020-01-07 NOTE — TELEPHONE ENCOUNTER
PT NEEDS A CALL BACK. SHE STATES THAT SHE CALLED TO GET HER PUMP SUPPLIES BUT BECAUSE OF HER INSURANCE SHE CAN NOT GET FILLED BEFORE THE 14TH. SHE STATES SHE WILL BE RUNNING OUT BY THEN, STATES SHE HAS INSULIN PENS LEFT SHE CAN USE BUT NEEDS TO KNOW WHAT NICOLETTE OR DR CARRILLO WANTS HER TO DO. CALL HER [phone]

## 2020-02-18 ENCOUNTER — OFFICE VISIT (OUTPATIENT)
Dept: ENDOCRINOLOGY | Facility: CLINIC | Age: 30
End: 2020-02-18

## 2020-02-18 VITALS
HEIGHT: 66 IN | SYSTOLIC BLOOD PRESSURE: 128 MMHG | WEIGHT: 153.8 LBS | HEART RATE: 65 BPM | OXYGEN SATURATION: 99 % | BODY MASS INDEX: 24.72 KG/M2 | DIASTOLIC BLOOD PRESSURE: 76 MMHG

## 2020-02-18 DIAGNOSIS — E55.9 VITAMIN D DEFICIENCY: ICD-10-CM

## 2020-02-18 DIAGNOSIS — F17.200 SMOKING: ICD-10-CM

## 2020-02-18 DIAGNOSIS — E10.65 TYPE 1 DIABETES MELLITUS WITH HYPERGLYCEMIA (HCC): Primary | ICD-10-CM

## 2020-02-18 LAB — HBA1C MFR BLD: 8.5 %

## 2020-02-18 PROCEDURE — 99214 OFFICE O/P EST MOD 30 MIN: CPT | Performed by: NURSE PRACTITIONER

## 2020-02-18 RX ORDER — BUSPIRONE HYDROCHLORIDE 15 MG/1
15 TABLET ORAL AS NEEDED
COMMUNITY
Start: 2019-12-18

## 2020-02-18 NOTE — PROGRESS NOTES
Subjective    Ella Olivera is a 30 y.o. female. she is here today for follow-up.    History of Present Illness       30 year old female presents for follow up            REASON - Diabetes Mellitus type 1         HPI        Duration/Timing -Diabetes Mellitus type 1 diagnosed in 2017        Quality -not controlled         Severity - moderate          Severity (Complication/Hospitalizations)        Secondary Macrovascular - no CVA, no PAD, no CAD        Secondary Microvascular--- neuropathy        Context-- presented to ER for fatigue, difficulty waking up and blood sugar was over 700          Diabetes Regimen           Metformin bid     trulicity once weekly        basaglar      Novolog     Quantity              Lab Results   Component Value Date     HGBA1C 9.46 (H) 05/13/2019               Blood Glucose Readings     Checks sugars 6 times daily and has been for the last 90 days      Has the dexcom     Downloaded and reviewed     Dated from Feb. 5 - Feb. 18, 2020     Average on dexcom 215    32% target    68% above target     0% low       Tandem pump     Average 221             Diet-   Regular diet      Drinking regular sodas         Associated Signs/Symptoms       Hyperglycemic Symptoms: dry mouth       Hypoglycemic Episodes: after fast acting insulin, sweating, nausea               The following portions of the patient's history were reviewed and updated as appropriate:   Past Medical History:   Diagnosis Date   • Allergic rhinitis    • Backache    • Cyst of ovary    • Endometriosis     clinical   • Endometriosis of pelvic peritoneum     h/o   • Genital warts     h/o   • Irregular periods    • Left lower quadrant pain    • Pelvic and perineal pain     has bladder tenderness, uterine tenderness, and left adnexal tenderness   • Urethritis    • Urinary tract infectious disease      Past Surgical History:   Procedure Laterality Date   • APPENDECTOMY     • CHOLECYSTECTOMY     • DENTAL PROCEDURE      ara  teeth extraction   • DIAGNOSTIC LAPAROSCOPY  2007    exam under anesthesia, diagnostic laparoscopy. lower abdominal pain of uncertain etiology. stage II endometriosis.   • TONSILLECTOMY  2002    chronic tonsillitis     Family History   Problem Relation Age of Onset   • Diabetes Other    • Hypertension Other      OB History        6    Para   1    Term           1    AB   5    Living   1       SAB   5    TAB        Ectopic        Molar        Multiple        Live Births                  Current Outpatient Medications   Medication Sig Dispense Refill   • ADMELOG 100 UNIT/ML injection Use up to 90 units daily via pump 30 mL 12   • busPIRone (BUSPAR) 15 MG tablet Take 15 mg by mouth Daily.     • FLUoxetine (PROzac) 20 MG capsule Take 20 mg by mouth Daily.  3   • gabapentin (NEURONTIN) 400 MG capsule      • glucagon (GLUCAGEN) 1 MG injection Inject 1 mg under the skin into the appropriate area as directed See Admin Instructions. Follow package directions for low blood sugar. 1 kit 1   • insulin aspart (NOVOLOG) 100 UNIT/ML injection Can use up to 90 units daily via pump. 10 mL 3   • Insulin Glargine (BASAGLAR KWIKPEN) 100 UNIT/ML injection pen Inject 20 units per day. 5 pen 5   • metFORMIN (GLUCOPHAGE) 500 MG tablet TAKE 1 TABLET BY MOUTH EVERY DAY WITH MEALS  0   • Prenatal Multivit-Min-Fe-FA (PRENATAL 1 + IRON PO) Take  by mouth Daily.     • sulfamethoxazole-trimethoprim (BACTRIM DS) 800-160 MG per tablet Take 1 tablet by mouth 2 (Two) Times a Day. 10 tablet 0   • TRULICITY 1.5 MG/0.5ML solution pen-injector INJECT 1.5 MG (0.5 ML) SUBCUTANEOUSLY ONCE A WEEK AS DIRECTED  1   • VENTOLIN  (90 Base) MCG/ACT inhaler        No current facility-administered medications for this visit.      Allergies   Allergen Reactions   • Aspirin    • Penicillins    • Reglan [Metoclopramide]      Social History     Socioeconomic History   • Marital status:      Spouse name: Not on file   • Number of  "children: Not on file   • Years of education: Not on file   • Highest education level: Not on file   Tobacco Use   • Smoking status: Current Every Day Smoker   • Smokeless tobacco: Never Used   Substance and Sexual Activity   • Alcohol use: No   • Drug use: Yes     Comment: past use   • Sexual activity: Yes     Partners: Male     Birth control/protection: None       Review of Systems  Review of Systems   Constitutional: Negative for activity change, appetite change, diaphoresis and fatigue.   HENT: Negative for facial swelling, sneezing, sore throat, tinnitus, trouble swallowing and voice change.    Eyes: Negative for photophobia, pain, discharge, redness, itching and visual disturbance.   Respiratory: Negative for apnea, cough, choking, chest tightness and shortness of breath.    Cardiovascular: Negative for chest pain, palpitations and leg swelling.   Gastrointestinal: Negative for abdominal distention, abdominal pain, constipation, diarrhea, nausea and vomiting.   Endocrine: Negative for cold intolerance, heat intolerance, polydipsia, polyphagia and polyuria.   Genitourinary: Negative for difficulty urinating, dysuria, frequency, hematuria and urgency.   Musculoskeletal: Negative for arthralgias, back pain, gait problem, joint swelling, myalgias, neck pain and neck stiffness.   Skin: Negative for color change, pallor, rash and wound.   Neurological: Negative for dizziness, tremors, weakness, light-headedness, numbness and headaches.   Hematological: Negative for adenopathy. Does not bruise/bleed easily.   Psychiatric/Behavioral: Negative for behavioral problems, confusion and sleep disturbance.        Objective    /76   Pulse 65   Ht 167.6 cm (66\")   Wt 69.8 kg (153 lb 12.8 oz)   SpO2 99%   BMI 24.82 kg/m²   Physical Exam   Constitutional: She is oriented to person, place, and time. She appears well-developed and well-nourished. No distress.   HENT:   Head: Normocephalic and atraumatic.   Right Ear: " External ear normal.   Left Ear: External ear normal.   Nose: Nose normal.   Eyes: Pupils are equal, round, and reactive to light. Conjunctivae and EOM are normal.   Neck: Normal range of motion. Neck supple. No tracheal deviation present. No thyromegaly present.   Cardiovascular: Normal rate, regular rhythm and normal heart sounds.   No murmur heard.  Pulmonary/Chest: Effort normal and breath sounds normal. No respiratory distress. She has no wheezes.   Abdominal: Soft. Bowel sounds are normal. There is no tenderness. There is no rebound and no guarding.   Musculoskeletal: Normal range of motion. She exhibits no edema, tenderness or deformity.   Neurological: She is alert and oriented to person, place, and time. No cranial nerve deficit.   Skin: Skin is warm and dry. No rash noted.   Psychiatric: She has a normal mood and affect. Her behavior is normal. Judgment and thought content normal.       Lab Review  Glucose (mg/dL)   Date Value   09/17/2019 182 (H)   05/17/2019 203 (H)     Sodium (mmol/L)   Date Value   09/17/2019 139   05/17/2019 142     Potassium (mmol/L)   Date Value   09/17/2019 4.0   05/17/2019 3.9     Chloride (mmol/L)   Date Value   09/17/2019 102   05/17/2019 105     CO2 (mmol/L)   Date Value   09/17/2019 25.4   05/17/2019 25.2     BUN (mg/dL)   Date Value   09/17/2019 14   05/17/2019 12     Creatinine (mg/dL)   Date Value   09/17/2019 0.74   05/17/2019 0.72     Hemoglobin A1C   Date Value   02/04/2020 8.5   09/17/2019 8.90 % (H)   05/13/2019 9.46 % (H)       Assessment/Plan      1. Type 1 diabetes mellitus with hyperglycemia (CMS/[facility])    2. Smoking    3. Vitamin D deficiency    .    Medications prescribed:  Outpatient Encounter Medications as of 2/18/2020   Medication Sig Dispense Refill   • ADMELOG 100 UNIT/ML injection Use up to 90 units daily via pump 30 mL 12   • busPIRone (BUSPAR) 15 MG tablet Take 15 mg by mouth Daily.     • FLUoxetine (PROzac) 20 MG capsule Take 20 mg by mouth Daily.  3   •  gabapentin (NEURONTIN) 400 MG capsule      • glucagon (GLUCAGEN) 1 MG injection Inject 1 mg under the skin into the appropriate area as directed See Admin Instructions. Follow package directions for low blood sugar. 1 kit 1   • insulin aspart (NOVOLOG) 100 UNIT/ML injection Can use up to 90 units daily via pump. 10 mL 3   • Insulin Glargine (BASAGLAR KWIKPEN) 100 UNIT/ML injection pen Inject 20 units per day. 5 pen 5   • metFORMIN (GLUCOPHAGE) 500 MG tablet TAKE 1 TABLET BY MOUTH EVERY DAY WITH MEALS  0   • Prenatal Multivit-Min-Fe-FA (PRENATAL 1 + IRON PO) Take  by mouth Daily.     • sulfamethoxazole-trimethoprim (BACTRIM DS) 800-160 MG per tablet Take 1 tablet by mouth 2 (Two) Times a Day. 10 tablet 0   • TRULICITY 1.5 MG/0.5ML solution pen-injector INJECT 1.5 MG (0.5 ML) SUBCUTANEOUSLY ONCE A WEEK AS DIRECTED  1   • VENTOLIN  (90 Base) MCG/ACT inhaler        No facility-administered encounter medications on file as of 2/18/2020.        Orders placed during this encounter include:  Orders Placed This Encounter   Procedures   • Hemoglobin A1c     This order was created through External Result Entry     Glycemic Management         Downloaded and reviewed     Dated from Feb. 5 - Feb. 18, 2020     Average on dexcom 215    32% target    68% above target     0% low     Fear of lows due to history of lows      Lab Results   Component Value Date    HGBA1C 8.5 02/04/2020             Component      Latest Ref Rng & Units 5/17/2019   Islet Cell Ab      Neg:<1:1 Negative   BRENDA-65      0.0 - 5.0 U/mL 2607.1 (H)   C-Peptide      1.1 - 4.4 ng/mL 0.6 (L)         Tandem insulin pump     Basal     0.60        Carb ratio    10    Correction     40             ----------------------------------------------------------------------------------------------  metformin 500 mg two po BID --- stopped   Trulicity 1.5 mg weekly -- stopped      Basaglar taking 20 units --stopped            Novolog ---- stopped    ---      1 unit per 10  grams of CHO          plus sliding scale 1 per 50 above 150                      Lipid Management        Patient is not on a statin         Jan. 2019         Total chol - 178  Tg - 98  HDL - 36  LDL - 122           Blood Pressure Management     bp controlled not on ACEi               Microvascular Complication Monitoring        May 2018         microalbuminuria      Ratio - 33 High               Gabapentin 400 mg      Bone Health        May 2018      Vitamin d 31       Feb. 2020     Vitamin d - 27.3     Start vitamin d      Other Diabetes Related Aspects              Thyroid Health        May 2018     TSH - 1.13         Weight Management:      Patient's Body mass index is 24.82 kg/m². BMI is within normal parameters. No follow-up required..          Preventive Care:     Patient is smoking      Ella Olivera  reports that she has been smoking. She has never used smokeless tobacco.. I have educated her on the risk of diseases from using tobacco products such as cancer, COPD and heart diease.     I advised her to quit and she is not willing to quit.    I spent 3  minutes counseling the patient.                      4. Follow-up: No follow-ups on file.

## 2020-04-01 ENCOUNTER — TELEPHONE (OUTPATIENT)
Dept: NEUROSURGERY | Facility: CLINIC | Age: 30
End: 2020-04-01
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
## 2020-04-17 ENCOUNTER — TELEPHONE (OUTPATIENT)
Dept: ENDOCRINOLOGY | Facility: CLINIC | Age: 30
End: 2020-04-17

## 2020-04-23 ENCOUNTER — TELEPHONE (OUTPATIENT)
Dept: ENDOCRINOLOGY | Facility: CLINIC | Age: 30
End: 2020-04-23

## 2020-04-23 NOTE — TELEPHONE ENCOUNTER
Left  that has a new dexacom and wants to know what to do?     Thank You          Documentation          Patient states she is all hooked up but she is not sure if she is on control IQ, is it reading her sugars and stopping or starting insulin ???  Thanks parisa

## 2020-05-26 ENCOUNTER — TELEPHONE (OUTPATIENT)
Dept: ENDOCRINOLOGY | Facility: CLINIC | Age: 30
End: 2020-05-26

## 2020-05-28 ENCOUNTER — OFFICE VISIT (OUTPATIENT)
Dept: NEUROSURGERY | Facility: CLINIC | Age: 30
End: 2020-05-28

## 2020-05-28 VITALS
BODY MASS INDEX: 24.3 KG/M2 | WEIGHT: 154.8 LBS | DIASTOLIC BLOOD PRESSURE: 68 MMHG | HEIGHT: 67 IN | SYSTOLIC BLOOD PRESSURE: 110 MMHG

## 2020-05-28 DIAGNOSIS — F17.200 SMOKING: ICD-10-CM

## 2020-05-28 DIAGNOSIS — M54.2 CERVICALGIA: Primary | ICD-10-CM

## 2020-05-28 PROCEDURE — 99213 OFFICE O/P EST LOW 20 MIN: CPT | Performed by: NURSE PRACTITIONER

## 2020-05-28 NOTE — PATIENT INSTRUCTIONS
Steps to Quit Smoking  Smoking tobacco is the leading cause of preventable death. It can affect almost every organ in the body. Smoking puts you and people around you at risk for many serious, long-lasting (chronic) diseases. Quitting smoking can be hard, but it is one of the best things that you can do for your health. It is never too late to quit.  How do I get ready to quit?  When you decide to quit smoking, make a plan to help you succeed. Before you quit:  · Pick a date to quit. Set a date within the next 2 weeks to give you time to prepare.  · Write down the reasons why you are quitting. Keep this list in places where you will see it often.  · Tell your family, friends, and co-workers that you are quitting. Their support is important.  · Talk with your doctor about the choices that may help you quit.  · Find out if your health insurance will pay for these treatments.  · Know the people, places, things, and activities that make you want to smoke (triggers). Avoid them.  What first steps can I take to quit smoking?  · Throw away all cigarettes at home, at work, and in your car.  · Throw away the things that you use when you smoke, such as ashtrays and lighters.  · Clean your car. Make sure to empty the ashtray.  · Clean your home, including curtains and carpets.  What can I do to help me quit smoking?  Talk with your doctor about taking medicines and seeing a counselor at the same time. You are more likely to succeed when you do both.  · If you are pregnant or breastfeeding, talk with your doctor about counseling or other ways to quit smoking. Do not take medicine to help you quit smoking unless your doctor tells you to do so.  To quit smoking:  Quit right away  · Quit smoking totally, instead of slowly cutting back on how much you smoke over a period of time.  · Go to counseling. You are more likely to quit if you go to counseling sessions regularly.  Take medicine  You may take medicines to help you quit. Some  medicines need a prescription, and some you can buy over-the-counter. Some medicines may contain a drug called nicotine to replace the nicotine in cigarettes. Medicines may:  · Help you to stop having the desire to smoke (cravings).  · Help to stop the problems that come when you stop smoking (withdrawal symptoms).  Your doctor may ask you to use:  · Nicotine patches, gum, or lozenges.  · Nicotine inhalers or sprays.  · Non-nicotine medicine that is taken by mouth.  Find resources  Find resources and other ways to help you quit smoking and remain smoke-free after you quit. These resources are most helpful when you use them often. They include:  · Online chats with a counselor.  · Phone quitlines.  · Printed self-help materials.  · Support groups or group counseling.  · Text messaging programs.  · Mobile phone apps. Use apps on your mobile phone or tablet that can help you stick to your quit plan. There are many free apps for mobile phones and tablets as well as websites. Examples include Quit Guide from the CDC and smokefree.gov    What things can I do to make it easier to quit?    · Talk to your family and friends. Ask them to support and encourage you.  · Call a phone quitline (9-944-QUITNOW), reach out to support groups, or work with a counselor.  · Ask people who smoke to not smoke around you.  · Avoid places that make you want to smoke, such as:  ? Bars.  ? Parties.  ? Smoke-break areas at work.  · Spend time with people who do not smoke.  · Lower the stress in your life. Stress can make you want to smoke. Try these things to help your stress:  ? Getting regular exercise.  ? Doing deep-breathing exercises.  ? Doing yoga.  ? Meditating.  ? Doing a body scan. To do this, close your eyes, focus on one area of your body at a time from head to toe. Notice which parts of your body are tense. Try to relax the muscles in those areas.  How will I feel when I quit smoking?  Day 1 to 3 weeks  Within the first 24 hours,  you may start to have some problems that come from quitting tobacco. These problems are very bad 2-3 days after you quit, but they do not often last for more than 2-3 weeks. You may get these symptoms:  · Mood swings.  · Feeling restless, nervous, angry, or annoyed.  · Trouble concentrating.  · Dizziness.  · Strong desire for high-sugar foods and nicotine.  · Weight gain.  · Trouble pooping (constipation).  · Feeling like you may vomit (nausea).  · Coughing or a sore throat.  · Changes in how the medicines that you take for other issues work in your body.  · Depression.  · Trouble sleeping (insomnia).  Week 3 and afterward  After the first 2-3 weeks of quitting, you may start to notice more positive results, such as:  · Better sense of smell and taste.  · Less coughing and sore throat.  · Slower heart rate.  · Lower blood pressure.  · Clearer skin.  · Better breathing.  · Fewer sick days.  Quitting smoking can be hard. Do not give up if you fail the first time. Some people need to try a few times before they succeed. Do your best to stick to your quit plan, and talk with your doctor if you have any questions or concerns.  Summary  · Smoking tobacco is the leading cause of preventable death. Quitting smoking can be hard, but it is one of the best things that you can do for your health.  · When you decide to quit smoking, make a plan to help you succeed.  · Quit smoking right away, not slowly over a period of time.  · When you start quitting, seek help from your doctor, family, or friends.  This information is not intended to replace advice given to you by your health care provider. Make sure you discuss any questions you have with your health care provider.  Document Released: 10/14/2010 Document Revised: 03/06/2020 Document Reviewed: 03/07/2020  Elsevier Patient Education © 2020 [employer]
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
## 2020-06-02 ENCOUNTER — TELEPHONE (OUTPATIENT)
Dept: ENDOCRINOLOGY | Facility: CLINIC | Age: 30
End: 2020-06-02

## 2020-06-02 NOTE — TELEPHONE ENCOUNTER
Pt's sugar is at 447 unsure about sliding scale can someone call her . Pt took 5 units to get down is that enough of novalog. Pt says not using the pump because hasn't got pump supplies , never even rec May shipment . Needs to know what to do to get sugar down until supplies come. Made appt tomorrow with Trey    [phone]  Thank You

## 2020-06-02 NOTE — TELEPHONE ENCOUNTER
Returning wayne hyperglycemia - no answer, mailbox full    If she calls back she needs to follow sliding scale below - every 4 hrs fast acting insulin (humalog or novolog or admelog)  120-160 give 1un  161-200 give 2uns  201-240 give 3 un  241-280 give 4 un  281-320 give 5 un  321-360 give 6 un  361-400 give 7 un  401-440 give 8 un.

## 2020-06-03 ENCOUNTER — OFFICE VISIT (OUTPATIENT)
Dept: ENDOCRINOLOGY | Facility: CLINIC | Age: 30
End: 2020-06-03

## 2020-06-03 VITALS
BODY MASS INDEX: 23.4 KG/M2 | HEART RATE: 75 BPM | HEIGHT: 67 IN | DIASTOLIC BLOOD PRESSURE: 68 MMHG | OXYGEN SATURATION: 95 % | SYSTOLIC BLOOD PRESSURE: 110 MMHG | WEIGHT: 149.1 LBS

## 2020-06-03 DIAGNOSIS — E10.65 TYPE 1 DIABETES MELLITUS WITH HYPERGLYCEMIA (HCC): Primary | ICD-10-CM

## 2020-06-03 DIAGNOSIS — E55.9 VITAMIN D DEFICIENCY: ICD-10-CM

## 2020-06-03 DIAGNOSIS — F17.200 SMOKING: ICD-10-CM

## 2020-06-03 PROCEDURE — 99214 OFFICE O/P EST MOD 30 MIN: CPT | Performed by: NURSE PRACTITIONER

## 2020-06-03 PROCEDURE — 95251 CONT GLUC MNTR ANALYSIS I&R: CPT | Performed by: NURSE PRACTITIONER

## 2020-06-03 NOTE — PROGRESS NOTES
Subjective    Ella Olivera is a 30 y.o. female. she is here today for follow-up.    History of Present Illness     IN OFFICE VISIT     30 year old female presents for follow up         REASON - Diabetes Mellitus type 1         HPI        Duration/Timing -Diabetes Mellitus type 1 diagnosed in 2017        Quality -not controlled         Severity - moderate          Severity (Complication/Hospitalizations)        Secondary Macrovascular - no CVA, no PAD, no CAD        Secondary Microvascular--- neuropathy        Context-- presented to ER for fatigue, difficulty waking up and blood sugar was over 700          Diabetes Regimen           INSULIN PUMP TANDEM             Quantity    Lab Results   Component Value Date    HGBA1C 8.5 02/04/2020                   Blood Glucose Readings     Checks sugars 6 times daily and has been for the last 90 days before dexcom      Has the dexcom now and able to monitor more frequently and having less hypoglycemia        Dexcom       Dated May 27 - June 2, 2020     Average 226        30 % in target    12 % high    58 % very high     0 % low          Tandem pump      Average 263               Diet-   Regular diet      Drinking regular sodas         Associated Signs/Symptoms       Hyperglycemic Symptoms: dry mouth       Hypoglycemic Episodes: none since dexcom                  The following portions of the patient's history were reviewed and updated as appropriate:   Past Medical History:   Diagnosis Date   • Allergic rhinitis    • Backache    • Cyst of ovary    • Endometriosis     clinical   • Endometriosis of pelvic peritoneum     h/o   • Genital warts     h/o   • Irregular periods    • Left lower quadrant pain    • Pelvic and perineal pain     has bladder tenderness, uterine tenderness, and left adnexal tenderness   • Urethritis    • Urinary tract infectious disease      Past Surgical History:   Procedure Laterality Date   • APPENDECTOMY     • CHOLECYSTECTOMY     • DENTAL  PROCEDURE      wisdom teeth extraction   • DIAGNOSTIC LAPAROSCOPY  2007    exam under anesthesia, diagnostic laparoscopy. lower abdominal pain of uncertain etiology. stage II endometriosis.   • TONSILLECTOMY  2002    chronic tonsillitis     Family History   Problem Relation Age of Onset   • Diabetes Other    • Hypertension Other    • No Known Problems Mother    • No Known Problems Father    • No Known Problems Sister    • No Known Problems Brother      OB History        6    Para   1    Term           1    AB   5    Living   1       SAB   5    TAB        Ectopic        Molar        Multiple        Live Births                  Current Outpatient Medications   Medication Sig Dispense Refill   • busPIRone (BUSPAR) 15 MG tablet Take 15 mg by mouth Daily.     • gabapentin (NEURONTIN) 400 MG capsule As Needed.     • insulin aspart (NovoLOG) 100 UNIT/ML injection Can use up to 90 units daily via pump. 30 mL 11   • VENTOLIN  (90 Base) MCG/ACT inhaler As Needed.     • ADMELOG 100 UNIT/ML injection Use up to 90 units daily via pump 30 mL 12   • FLUoxetine (PROzac) 20 MG capsule Take 20 mg by mouth Daily.  3   • glucagon (GLUCAGEN) 1 MG injection Inject 1 mg under the skin into the appropriate area as directed See Admin Instructions. Follow package directions for low blood sugar. 1 kit 1   • Insulin Glargine (BASAGLAR KWIKPEN) 100 UNIT/ML injection pen Inject 20 units per day. 5 pen 5   • metFORMIN (GLUCOPHAGE) 500 MG tablet TAKE 1 TABLET BY MOUTH EVERY DAY WITH MEALS  0   • Prenatal Multivit-Min-Fe-FA (PRENATAL 1 + IRON PO) Take  by mouth Daily.     • sulfamethoxazole-trimethoprim (BACTRIM DS) 800-160 MG per tablet Take 1 tablet by mouth 2 (Two) Times a Day. 10 tablet 0   • TRULICITY 1.5 MG/0.5ML solution pen-injector INJECT 1.5 MG (0.5 ML) SUBCUTANEOUSLY ONCE A WEEK AS DIRECTED  1     No current facility-administered medications for this visit.      Allergies   Allergen Reactions   • Aspirin   "  • Cephalexin Nausea And Vomiting   • Penicillins    • Reglan [Metoclopramide]      Social History     Socioeconomic History   • Marital status:      Spouse name: Not on file   • Number of children: Not on file   • Years of education: Not on file   • Highest education level: Not on file   Tobacco Use   • Smoking status: Current Every Day Smoker   • Smokeless tobacco: Never Used   Substance and Sexual Activity   • Alcohol use: No   • Drug use: Yes     Comment: past use   • Sexual activity: Yes     Partners: Male     Birth control/protection: None       Review of Systems  Review of Systems   Constitutional: Negative for activity change, appetite change, diaphoresis and fatigue.   HENT: Negative for facial swelling, sneezing, sore throat, tinnitus, trouble swallowing and voice change.    Eyes: Negative for photophobia, pain, discharge, redness, itching and visual disturbance.   Respiratory: Negative for apnea, cough, choking, chest tightness and shortness of breath.    Cardiovascular: Negative for chest pain, palpitations and leg swelling.   Gastrointestinal: Negative for abdominal distention, abdominal pain, constipation, diarrhea, nausea and vomiting.   Endocrine: Negative for cold intolerance, heat intolerance, polydipsia, polyphagia and polyuria.   Genitourinary: Negative for difficulty urinating, dysuria, frequency, hematuria and urgency.   Musculoskeletal: Negative for arthralgias, back pain, gait problem, joint swelling, myalgias, neck pain and neck stiffness.   Skin: Negative for color change, pallor, rash and wound.   Neurological: Negative for dizziness, tremors, weakness, light-headedness, numbness and headaches.   Hematological: Negative for adenopathy. Does not bruise/bleed easily.   Psychiatric/Behavioral: Negative for behavioral problems, confusion and sleep disturbance.        Objective    /68   Pulse 75   Ht 168.9 cm (66.5\")   Wt 67.6 kg (149 lb 1.6 oz)   SpO2 95%   BMI 23.70 kg/m² "   Physical Exam   Constitutional: She is oriented to person, place, and time. She appears well-developed and well-nourished. No distress.   HENT:   Head: Normocephalic and atraumatic.   Right Ear: External ear normal.   Left Ear: External ear normal.   Nose: Nose normal.   Eyes: Pupils are equal, round, and reactive to light. Conjunctivae and EOM are normal.   Neck: Normal range of motion. Neck supple. No tracheal deviation present. No thyromegaly present.   Cardiovascular: Normal rate, regular rhythm and normal heart sounds.   No murmur heard.  Pulmonary/Chest: Effort normal and breath sounds normal. No respiratory distress. She has no wheezes.   Abdominal: Soft. Bowel sounds are normal. There is no tenderness. There is no rebound and no guarding.   Musculoskeletal: Normal range of motion. She exhibits no edema, tenderness or deformity.   Neurological: She is alert and oriented to person, place, and time. No cranial nerve deficit.   Skin: Skin is warm and dry. No rash noted.   Psychiatric: She has a normal mood and affect. Her behavior is normal. Judgment and thought content normal.       Lab Review  Glucose (mg/dL)   Date Value   09/17/2019 182 (H)   05/17/2019 203 (H)     Sodium (mmol/L)   Date Value   09/17/2019 139   05/17/2019 142     Potassium (mmol/L)   Date Value   09/17/2019 4.0   05/17/2019 3.9     Chloride (mmol/L)   Date Value   09/17/2019 102   05/17/2019 105     CO2 (mmol/L)   Date Value   09/17/2019 25.4   05/17/2019 25.2     BUN (mg/dL)   Date Value   09/17/2019 14   05/17/2019 12     Creatinine (mg/dL)   Date Value   09/17/2019 0.74   05/17/2019 0.72     Hemoglobin A1C   Date Value   02/04/2020 8.5   09/17/2019 8.90 % (H)   05/13/2019 9.46 % (H)       Assessment/Plan      1. Type 1 diabetes mellitus with hyperglycemia (CMS/[facility])    2. Vitamin D deficiency    3. Smoking    .    Medications prescribed:  Outpatient Encounter Medications as of 6/3/2020   Medication Sig Dispense Refill   • busPIRone  (BUSPAR) 15 MG tablet Take 15 mg by mouth Daily.     • gabapentin (NEURONTIN) 400 MG capsule As Needed.     • insulin aspart (NovoLOG) 100 UNIT/ML injection Can use up to 90 units daily via pump. 30 mL 11   • VENTOLIN  (90 Base) MCG/ACT inhaler As Needed.     • [DISCONTINUED] insulin aspart (NOVOLOG) 100 UNIT/ML injection Can use up to 90 units daily via pump. 10 mL 3   • ADMELOG 100 UNIT/ML injection Use up to 90 units daily via pump 30 mL 12   • FLUoxetine (PROzac) 20 MG capsule Take 20 mg by mouth Daily.  3   • glucagon (GLUCAGEN) 1 MG injection Inject 1 mg under the skin into the appropriate area as directed See Admin Instructions. Follow package directions for low blood sugar. 1 kit 1   • Insulin Glargine (BASAGLAR KWIKPEN) 100 UNIT/ML injection pen Inject 20 units per day. 5 pen 5   • metFORMIN (GLUCOPHAGE) 500 MG tablet TAKE 1 TABLET BY MOUTH EVERY DAY WITH MEALS  0   • Prenatal Multivit-Min-Fe-FA (PRENATAL 1 + IRON PO) Take  by mouth Daily.     • sulfamethoxazole-trimethoprim (BACTRIM DS) 800-160 MG per tablet Take 1 tablet by mouth 2 (Two) Times a Day. 10 tablet 0   • TRULICITY 1.5 MG/0.5ML solution pen-injector INJECT 1.5 MG (0.5 ML) SUBCUTANEOUSLY ONCE A WEEK AS DIRECTED  1     No facility-administered encounter medications on file as of 6/3/2020.        Orders placed during this encounter include:  No orders of the defined types were placed in this encounter.    Glycemic Management       Diabetes mellitus type 1       Downloaded and reviewed      Dated May 27 - June 2, 2020      Average on dexcom 226     30 %  target     12 % high     58 % very high      0% low      Fear of lows due to history of lows            Lab Results   Component Value Date     HGBA1C 8.5 02/04/2020               Component      Latest Ref Rng & Units 5/17/2019   Islet Cell Ab      Neg:<1:1 Negative   BRENDA-65      0.0 - 5.0 U/mL 2607.1 (H)   C-Peptide      1.1 - 4.4 ng/mL 0.6 (L)       she is off pump until tomorrow due to no  pump supplies        Tandem insulin pump      Basal      0.60--- increase to 0.65                Carb ratio     10     Correction      40          gave basaglar 16 units in office she has been off the pump since yesterday       She will get pump supplies tomorrow she can restart after 20 hours of giving Basaglar     Will use novolog injections until starts pump         ----------------------------------------------------------------------------------------------  metformin 500 mg two po BID --- stopped   Trulicity 1.5 mg weekly -- stopped      Basaglar taking 20 units --stopped            Novolog ---- stopped    ---      1 unit per 10 grams of CHO          plus sliding scale 1 per 50 above 150                       Lipid Management        Patient is not on a statin         Jan. 2019         Total chol - 178  Tg - 98  HDL - 36  LDL - 122           Blood Pressure Management     bp controlled not on ACEi               Microvascular Complication Monitoring        May 2018         microalbuminuria      Ratio - 33 High               Gabapentin 400 mg      Bone Health        May 2018      Vitamin d 31 Feb. 2020      Vitamin d - 27.3      Start vitamin d      Other Diabetes Related Aspects              Thyroid Health            Lab Results   Component Value Date    TSH 0.751 09/17/2019             Weight Management:        Patient's Body mass index is 23.7 kg/m². BMI is within normal parameters. No follow-up required..             Preventive Care:     Patient is smoking            Ella Olivera  reports that she has been smoking. She has never used smokeless tobacco.. I have educated her on the risk of diseases from using tobacco products such as cancer, COPD and heart diease.     I advised her to quit and she is not willing to quit.    I spent 3  minutes counseling the patient.               4. Follow-up: Return in about 3 months (around 9/3/2020) for Recheck.

## 2020-09-03 ENCOUNTER — LAB (OUTPATIENT)
Dept: LAB | Facility: HOSPITAL | Age: 30
End: 2020-09-03

## 2020-09-03 ENCOUNTER — OFFICE VISIT (OUTPATIENT)
Dept: ENDOCRINOLOGY | Facility: CLINIC | Age: 30
End: 2020-09-03

## 2020-09-03 VITALS
WEIGHT: 150.4 LBS | BODY MASS INDEX: 23.61 KG/M2 | HEART RATE: 75 BPM | SYSTOLIC BLOOD PRESSURE: 102 MMHG | HEIGHT: 67 IN | DIASTOLIC BLOOD PRESSURE: 68 MMHG | OXYGEN SATURATION: 98 %

## 2020-09-03 DIAGNOSIS — F17.200 SMOKING: ICD-10-CM

## 2020-09-03 DIAGNOSIS — E10.65 TYPE 1 DIABETES MELLITUS WITH HYPERGLYCEMIA (HCC): ICD-10-CM

## 2020-09-03 DIAGNOSIS — E55.9 VITAMIN D DEFICIENCY: Primary | ICD-10-CM

## 2020-09-03 LAB
25(OH)D3 SERPL-MCNC: 32.2 NG/ML (ref 30–100)
ALBUMIN SERPL-MCNC: 4.6 G/DL (ref 3.5–5.2)
ALBUMIN UR-MCNC: <1.2 MG/DL
ALBUMIN/GLOB SERPL: 1.9 G/DL
ALP SERPL-CCNC: 87 U/L (ref 39–117)
ALT SERPL W P-5'-P-CCNC: 10 U/L (ref 1–33)
ANION GAP SERPL CALCULATED.3IONS-SCNC: 9.6 MMOL/L (ref 5–15)
AST SERPL-CCNC: 15 U/L (ref 1–32)
BASOPHILS # BLD AUTO: 0.1 10*3/MM3 (ref 0–0.2)
BASOPHILS NFR BLD AUTO: 0.9 % (ref 0–1.5)
BILIRUB SERPL-MCNC: 0.2 MG/DL (ref 0–1.2)
BUN SERPL-MCNC: 12 MG/DL (ref 6–20)
BUN/CREAT SERPL: 15.2 (ref 7–25)
CALCIUM SPEC-SCNC: 9.3 MG/DL (ref 8.6–10.5)
CHLORIDE SERPL-SCNC: 101 MMOL/L (ref 98–107)
CHOLEST SERPL-MCNC: 201 MG/DL (ref 0–200)
CO2 SERPL-SCNC: 25.4 MMOL/L (ref 22–29)
CREAT SERPL-MCNC: 0.79 MG/DL (ref 0.57–1)
CREAT UR-MCNC: 58.6 MG/DL
CREAT UR-MCNC: 58.6 MG/DL
DEPRECATED RDW RBC AUTO: 38.7 FL (ref 37–54)
EOSINOPHIL # BLD AUTO: 0.65 10*3/MM3 (ref 0–0.4)
EOSINOPHIL NFR BLD AUTO: 5.7 % (ref 0.3–6.2)
ERYTHROCYTE [DISTWIDTH] IN BLOOD BY AUTOMATED COUNT: 11.8 % (ref 12.3–15.4)
GFR SERPL CREATININE-BSD FRML MDRD: 84 ML/MIN/1.73
GLOBULIN UR ELPH-MCNC: 2.4 GM/DL
GLUCOSE SERPL-MCNC: 308 MG/DL (ref 65–99)
HBA1C MFR BLD: 10 % (ref 4.8–5.6)
HCT VFR BLD AUTO: 44.7 % (ref 34–46.6)
HDLC SERPL-MCNC: 38 MG/DL (ref 40–60)
HGB BLD-MCNC: 14.9 G/DL (ref 12–15.9)
IMM GRANULOCYTES # BLD AUTO: 0.02 10*3/MM3 (ref 0–0.05)
IMM GRANULOCYTES NFR BLD AUTO: 0.2 % (ref 0–0.5)
LDLC SERPL CALC-MCNC: 143 MG/DL (ref 0–100)
LDLC/HDLC SERPL: 3.76 {RATIO}
LYMPHOCYTES # BLD AUTO: 1.78 10*3/MM3 (ref 0.7–3.1)
LYMPHOCYTES NFR BLD AUTO: 15.7 % (ref 19.6–45.3)
MCH RBC QN AUTO: 30 PG (ref 26.6–33)
MCHC RBC AUTO-ENTMCNC: 33.3 G/DL (ref 31.5–35.7)
MCV RBC AUTO: 90.1 FL (ref 79–97)
MICROALBUMIN/CREAT UR: NORMAL MG/G{CREAT}
MONOCYTES # BLD AUTO: 0.67 10*3/MM3 (ref 0.1–0.9)
MONOCYTES NFR BLD AUTO: 5.9 % (ref 5–12)
NEUTROPHILS NFR BLD AUTO: 71.6 % (ref 42.7–76)
NEUTROPHILS NFR BLD AUTO: 8.13 10*3/MM3 (ref 1.7–7)
NRBC BLD AUTO-RTO: 0 /100 WBC (ref 0–0.2)
PLATELET # BLD AUTO: 284 10*3/MM3 (ref 140–450)
PMV BLD AUTO: 11.7 FL (ref 6–12)
POTASSIUM SERPL-SCNC: 4 MMOL/L (ref 3.5–5.2)
PROT SERPL-MCNC: 7 G/DL (ref 6–8.5)
PROT UR-MCNC: 5 MG/DL
PROT/CREAT UR: 85.3 MG/G CREA (ref 0–200)
RBC # BLD AUTO: 4.96 10*6/MM3 (ref 3.77–5.28)
SODIUM SERPL-SCNC: 136 MMOL/L (ref 136–145)
TRIGL SERPL-MCNC: 101 MG/DL (ref 0–150)
TSH SERPL DL<=0.05 MIU/L-ACNC: 0.58 UIU/ML (ref 0.27–4.2)
VIT B12 BLD-MCNC: 742 PG/ML (ref 211–946)
VLDLC SERPL-MCNC: 20.2 MG/DL (ref 5–40)
WBC # BLD AUTO: 11.35 10*3/MM3 (ref 3.4–10.8)

## 2020-09-03 PROCEDURE — 82043 UR ALBUMIN QUANTITATIVE: CPT | Performed by: NURSE PRACTITIONER

## 2020-09-03 PROCEDURE — 95251 CONT GLUC MNTR ANALYSIS I&R: CPT | Performed by: NURSE PRACTITIONER

## 2020-09-03 PROCEDURE — 85025 COMPLETE CBC W/AUTO DIFF WBC: CPT | Performed by: NURSE PRACTITIONER

## 2020-09-03 PROCEDURE — 84156 ASSAY OF PROTEIN URINE: CPT | Performed by: NURSE PRACTITIONER

## 2020-09-03 PROCEDURE — 82306 VITAMIN D 25 HYDROXY: CPT | Performed by: NURSE PRACTITIONER

## 2020-09-03 PROCEDURE — 84443 ASSAY THYROID STIM HORMONE: CPT | Performed by: NURSE PRACTITIONER

## 2020-09-03 PROCEDURE — 82607 VITAMIN B-12: CPT | Performed by: NURSE PRACTITIONER

## 2020-09-03 PROCEDURE — 83036 HEMOGLOBIN GLYCOSYLATED A1C: CPT | Performed by: NURSE PRACTITIONER

## 2020-09-03 PROCEDURE — 36415 COLL VENOUS BLD VENIPUNCTURE: CPT | Performed by: NURSE PRACTITIONER

## 2020-09-03 PROCEDURE — 82570 ASSAY OF URINE CREATININE: CPT | Performed by: NURSE PRACTITIONER

## 2020-09-03 PROCEDURE — 80061 LIPID PANEL: CPT | Performed by: NURSE PRACTITIONER

## 2020-09-03 PROCEDURE — 80053 COMPREHEN METABOLIC PANEL: CPT | Performed by: NURSE PRACTITIONER

## 2020-09-03 PROCEDURE — 99214 OFFICE O/P EST MOD 30 MIN: CPT | Performed by: NURSE PRACTITIONER

## 2020-09-03 RX ORDER — ALBUTEROL SULFATE 90 UG/1
2 AEROSOL, METERED RESPIRATORY (INHALATION) EVERY 4 HOURS PRN
Qty: 9 G | Refills: 11 | Status: SHIPPED | OUTPATIENT
Start: 2020-09-03 | End: 2023-02-27 | Stop reason: SDUPTHER

## 2020-09-03 NOTE — PROGRESS NOTES
Subjective    Ella Olivera is a 30 y.o. female. she is here today for follow-up.    History of Present Illness     IN OFFICE VISIT       30 year old female presents for follow up         REASON - Diabetes Mellitus type 1         HPI        Duration/Timing -Diabetes Mellitus type 1 diagnosed in 2017        Quality -not controlled         Severity - moderate          Severity (Complication/Hospitalizations)        Secondary Macrovascular - no CVA, no PAD, no CAD        Secondary Microvascular--- neuropathy        Context-- presented to ER for fatigue, difficulty waking up and blood sugar was over 700          Diabetes Regimen           INSULIN PUMP TANDEM              Quantity           Lab Results   Component Value Date     HGBA1C 8.5 02/04/2020                     Blood Glucose Readings     Checks sugars 6 times daily and has been for the last 90 days before dexcom      Has the dexcom now and able to monitor more frequently and having less hypoglycemia         Dexcom G6        Dated August 21 - Sept 3, 2020      Average 247         9 % in target     36% high     54 % very high      0 % low            Tandem pump      Average 244              Diet-   Regular diet      Drinking regular sodas         Associated Signs/Symptoms       Hyperglycemic Symptoms: dry mouth       Hypoglycemic Episodes: none since dexcom                The following portions of the patient's history were reviewed and updated as appropriate:   Past Medical History:   Diagnosis Date   • Allergic rhinitis    • Backache    • Cyst of ovary    • Endometriosis     clinical   • Endometriosis of pelvic peritoneum     h/o   • Genital warts     h/o   • Irregular periods    • Left lower quadrant pain    • Pelvic and perineal pain     has bladder tenderness, uterine tenderness, and left adnexal tenderness   • Urethritis    • Urinary tract infectious disease      Past Surgical History:   Procedure Laterality Date   • APPENDECTOMY     •  CHOLECYSTECTOMY     • DENTAL PROCEDURE      wisdom teeth extraction   • DIAGNOSTIC LAPAROSCOPY  2007    exam under anesthesia, diagnostic laparoscopy. lower abdominal pain of uncertain etiology. stage II endometriosis.   • TONSILLECTOMY  2002    chronic tonsillitis     Family History   Problem Relation Age of Onset   • Diabetes Other    • Hypertension Other    • No Known Problems Mother    • No Known Problems Father    • No Known Problems Sister    • No Known Problems Brother      OB History        6    Para   1    Term           1    AB   5    Living   1       SAB   5    TAB        Ectopic        Molar        Multiple        Live Births                  Current Outpatient Medications   Medication Sig Dispense Refill   • ADMELOG 100 UNIT/ML injection Use up to 90 units daily via pump 30 mL 12   • busPIRone (BUSPAR) 15 MG tablet Take 15 mg by mouth Daily.     • FLUoxetine (PROzac) 20 MG capsule Take 20 mg by mouth Daily.  3   • gabapentin (NEURONTIN) 400 MG capsule As Needed.     • glucagon (GLUCAGEN) 1 MG injection Inject 1 mg under the skin into the appropriate area as directed See Admin Instructions. Follow package directions for low blood sugar. 1 kit 1   • insulin aspart (NovoLOG) 100 UNIT/ML injection Can use up to 90 units daily via pump. 30 mL 11   • Prenatal Multivit-Min-Fe-FA (PRENATAL 1 + IRON PO) Take  by mouth Daily.     • VENTOLIN  (90 Base) MCG/ACT inhaler Inhale 2 puffs Every 4 (Four) Hours As Needed for Wheezing. 9 g 11   • Insulin Glargine (BASAGLAR KWIKPEN) 100 UNIT/ML injection pen Inject 20 units per day. 5 pen 5     No current facility-administered medications for this visit.      Allergies   Allergen Reactions   • Aspirin    • Cephalexin Nausea And Vomiting   • Penicillins    • Reglan [Metoclopramide]      Social History     Socioeconomic History   • Marital status:      Spouse name: Not on file   • Number of children: Not on file   • Years of education:  "Not on file   • Highest education level: Not on file   Tobacco Use   • Smoking status: Current Every Day Smoker     Types: Cigarettes   • Smokeless tobacco: Never Used   Substance and Sexual Activity   • Alcohol use: No   • Drug use: Defer     Comment: past use   • Sexual activity: Defer     Partners: Male     Birth control/protection: None       Review of Systems  Review of Systems   Constitutional: Negative for activity change, appetite change, diaphoresis and fatigue.   HENT: Negative for facial swelling, sneezing, sore throat, tinnitus, trouble swallowing and voice change.    Eyes: Negative for photophobia, pain, discharge, redness, itching and visual disturbance.   Respiratory: Negative for apnea, cough, choking, chest tightness and shortness of breath.    Cardiovascular: Negative for chest pain, palpitations and leg swelling.   Gastrointestinal: Negative for abdominal distention, abdominal pain, constipation, diarrhea, nausea and vomiting.   Endocrine: Negative for cold intolerance, heat intolerance, polydipsia, polyphagia and polyuria.   Genitourinary: Negative for difficulty urinating, dysuria, frequency, hematuria and urgency.   Musculoskeletal: Negative for arthralgias, back pain, gait problem, joint swelling, myalgias, neck pain and neck stiffness.   Skin: Negative for color change, pallor, rash and wound.   Neurological: Negative for dizziness, tremors, weakness, light-headedness, numbness and headaches.   Hematological: Negative for adenopathy. Does not bruise/bleed easily.   Psychiatric/Behavioral: Negative for behavioral problems, confusion and sleep disturbance.        Objective    /68   Pulse 75   Ht 168.9 cm (66.5\")   Wt 68.2 kg (150 lb 6.4 oz)   SpO2 98%   BMI 23.91 kg/m²   Physical Exam   Constitutional: She is oriented to person, place, and time. She appears well-developed and well-nourished. No distress.   HENT:   Head: Normocephalic and atraumatic.   Right Ear: External ear normal. "   Left Ear: External ear normal.   Nose: Nose normal.   Eyes: Pupils are equal, round, and reactive to light. Conjunctivae and EOM are normal.   Neck: Normal range of motion. Neck supple. No tracheal deviation present. No thyromegaly present.   Cardiovascular: Normal rate, regular rhythm and normal heart sounds.   No murmur heard.  Pulmonary/Chest: Effort normal and breath sounds normal. No respiratory distress. She has no wheezes.   Abdominal: Soft. Bowel sounds are normal. There is no tenderness. There is no rebound and no guarding.   Musculoskeletal: Normal range of motion. She exhibits no edema, tenderness or deformity.   Neurological: She is alert and oriented to person, place, and time. No cranial nerve deficit.   Skin: Skin is warm and dry. No rash noted.   Psychiatric: She has a normal mood and affect. Her behavior is normal. Judgment and thought content normal.       Lab Review  Glucose (mg/dL)   Date Value   09/17/2019 182 (H)   05/17/2019 203 (H)     Sodium (mmol/L)   Date Value   09/17/2019 139   05/17/2019 142     Potassium (mmol/L)   Date Value   09/17/2019 4.0   05/17/2019 3.9     Chloride (mmol/L)   Date Value   09/17/2019 102   05/17/2019 105     CO2 (mmol/L)   Date Value   09/17/2019 25.4   05/17/2019 25.2     BUN (mg/dL)   Date Value   09/17/2019 14   05/17/2019 12     Creatinine (mg/dL)   Date Value   09/17/2019 0.74   05/17/2019 0.72     Hemoglobin A1C   Date Value   02/04/2020 8.5   09/17/2019 8.90 % (H)   05/13/2019 9.46 % (H)       Assessment/Plan      1. Vitamin D deficiency    2. Type 1 diabetes mellitus with hyperglycemia (CMS/[facility])    3. Smoking    .    Medications prescribed:  Outpatient Encounter Medications as of 9/3/2020   Medication Sig Dispense Refill   • ADMELOG 100 UNIT/ML injection Use up to 90 units daily via pump 30 mL 12   • busPIRone (BUSPAR) 15 MG tablet Take 15 mg by mouth Daily.     • FLUoxetine (PROzac) 20 MG capsule Take 20 mg by mouth Daily.  3   • gabapentin (NEURONTIN)  400 MG capsule As Needed.     • glucagon (GLUCAGEN) 1 MG injection Inject 1 mg under the skin into the appropriate area as directed See Admin Instructions. Follow package directions for low blood sugar. 1 kit 1   • insulin aspart (NovoLOG) 100 UNIT/ML injection Can use up to 90 units daily via pump. 30 mL 11   • Prenatal Multivit-Min-Fe-FA (PRENATAL 1 + IRON PO) Take  by mouth Daily.     • VENTOLIN  (90 Base) MCG/ACT inhaler Inhale 2 puffs Every 4 (Four) Hours As Needed for Wheezing. 9 g 11   • [DISCONTINUED] insulin aspart (NovoLOG) 100 UNIT/ML injection Can use up to 90 units daily via pump. 30 mL 11   • [DISCONTINUED] metFORMIN (GLUCOPHAGE) 500 MG tablet TAKE 1 TABLET BY MOUTH EVERY DAY WITH MEALS  0   • [DISCONTINUED] sulfamethoxazole-trimethoprim (BACTRIM DS) 800-160 MG per tablet Take 1 tablet by mouth 2 (Two) Times a Day. 10 tablet 0   • [DISCONTINUED] TRULICITY 1.5 MG/0.5ML solution pen-injector INJECT 1.5 MG (0.5 ML) SUBCUTANEOUSLY ONCE A WEEK AS DIRECTED  1   • [DISCONTINUED] VENTOLIN  (90 Base) MCG/ACT inhaler As Needed.     • Insulin Glargine (BASAGLAR KWIKPEN) 100 UNIT/ML injection pen Inject 20 units per day. 5 pen 5     No facility-administered encounter medications on file as of 9/3/2020.        Orders placed during this encounter include:  Orders Placed This Encounter   Procedures   • Comprehensive Metabolic Panel   • Hemoglobin A1c   • Vitamin D 25 Hydroxy   • Vitamin B12   • TSH   • Protein / Creatinine Ratio, Urine - Urine, Clean Catch   • Microalbumin / Creatinine Urine Ratio - Urine, Clean Catch   • Lipid Panel   • CBC Auto Differential   • CBC & Differential     Order Specific Question:   Manual Differential     Answer:   No       Glycemic Management        Diabetes mellitus type 1      Dexcom G6        Dated August 21 - Sept 3, 2020      Average 247         9 % in target     36% high     54 % very high      0 % low            Tandem pump      Average 244       Fear of lows due  to history of lows                Lab Results   Component Value Date     HGBA1C 8.5 02/04/2020               Component      Latest Ref Rng & Units 5/17/2019   Islet Cell Ab      Neg:<1:1 Negative   BRENDA-65      0.0 - 5.0 U/mL 2607.1 (H)   C-Peptide      1.1 - 4.4 ng/mL 0.6 (L)                 Tandem insulin pump      Basal       0.65 ---increase to 0.70-                  Carb ratio     10     Correction      40                     ----------------------------------------------------------------------------------------------  metformin 500 mg two po BID --- stopped   Trulicity 1.5 mg weekly -- stopped      Basaglar taking 20 units --stopped            Novolog ---- stopped    ---      1 unit per 10 grams of CHO          plus sliding scale 1 per 50 above 150                       Lipid Management        Patient is not on a statin         Jan. 2019         Total chol - 178  Tg - 98  HDL - 36  LDL - 122           Blood Pressure Management     bp controlled not on ACEi               Microvascular Complication Monitoring        May 2018         microalbuminuria      Ratio - 33 High               Gabapentin 400 mg      Bone Health        May 2018      Vitamin d 31 Feb. 2020      Vitamin d - 27.3      Start vitamin d      Other Diabetes Related Aspects              Thyroid Health        Lab Results   Component Value Date    TSH 0.751 09/17/2019                   Weight Management:        Patient's Body mass index is 23.91 kg/m². BMI is within normal parameters. No follow-up required..                  Preventive Care:     Patient is smoking            Ella Chery Olivera  reports that she has been smoking cigarettes. She has never used smokeless tobacco.. I have educated her on the risk of diseases from using tobacco products such as cancer, COPD and heart diease.     I advised her to quit and she is not willing to quit.    I spent 3  minutes counseling the patient.                      4. Follow-up: Return in about 3  months (around 12/3/2020) for Recheck.

## 2020-09-04 ENCOUNTER — TELEPHONE (OUTPATIENT)
Dept: ENDOCRINOLOGY | Facility: CLINIC | Age: 30
End: 2020-09-04

## 2020-09-04 NOTE — TELEPHONE ENCOUNTER
Mailbox full   ----- Message from LILLIE Esquivel sent at 9/4/2020  7:58 AM CDT -----  Urine negative for proteiin ; vitamin d , b12 and thyroid normal; A1c went up to 10 and was 8.5 -- I increased her settings yesterday in her pump -- if she still runs high she needs to call so we can make more adjustments

## 2020-09-15 ENCOUNTER — DOCUMENTATION (OUTPATIENT)
Dept: ENDOCRINOLOGY | Facility: CLINIC | Age: 30
End: 2020-09-15

## 2020-09-24 ENCOUNTER — TELEPHONE (OUTPATIENT)
Dept: ENDOCRINOLOGY | Facility: CLINIC | Age: 30
End: 2020-09-24

## 2020-09-24 NOTE — TELEPHONE ENCOUNTER
Pt called stating that her sugars have been running really high lately and she cannot keep them down, says at one point it was at 560. I have let her know that I will send a message to the nurse, but if they continue to go above 500, she may need to go to the ER to be seen. She would like a call to discuss. She mentioned being around a relative with COVID-19, but she tested negative herself, as well.

## 2020-09-25 ENCOUNTER — TELEPHONE (OUTPATIENT)
Dept: ENDOCRINOLOGY | Facility: CLINIC | Age: 30
End: 2020-09-25

## 2020-09-25 NOTE — TELEPHONE ENCOUNTER
Pt went to er at [facility] and is confused about DKA and not sure what she is doing wrong and said she also has a UTI .    She has missed a call from us so if we can call back would be great [phone]    Thank You

## 2020-09-25 NOTE — TELEPHONE ENCOUNTER
Reviewed how pt can get in DKA and what that is. Pt agreed she left her site in too long then the insulin wasn't absorbing well. She verbalized better understanding of DKA and how to prevent it.

## 2020-10-05 ENCOUNTER — TELEPHONE (OUTPATIENT)
Dept: ENDOCRINOLOGY | Facility: CLINIC | Age: 30
End: 2020-10-05

## 2020-10-05 RX ORDER — INSULIN GLARGINE 100 [IU]/ML
20 INJECTION, SOLUTION SUBCUTANEOUS DAILY
Qty: 3 PEN | Refills: 11 | Status: SHIPPED | OUTPATIENT
Start: 2020-10-05 | End: 2021-03-15 | Stop reason: SDUPTHER

## 2020-10-05 RX ORDER — ALCOHOL ANTISEPTIC PADS
PADS, MEDICATED (EA) TOPICAL
Qty: 120 EACH | Refills: 11 | Status: SHIPPED | OUTPATIENT
Start: 2020-10-05

## 2020-10-05 RX ORDER — INSULIN ASPART 100 [IU]/ML
INJECTION, SOLUTION INTRAVENOUS; SUBCUTANEOUS
Qty: 5 PEN | Refills: 11 | Status: SHIPPED | OUTPATIENT
Start: 2020-10-05 | End: 2021-05-28 | Stop reason: SDUPTHER

## 2020-10-05 NOTE — TELEPHONE ENCOUNTER
Pt says pump is during replacing to another spot not working possibly scar tissue is keeping it from giving her the insulin . Can some one call and see if possibly in a wrong spot.[phone]    Thank You

## 2020-10-05 NOTE — TELEPHONE ENCOUNTER
Pump stopped working and needs pen needles and Novalog flexpen  And basaglar in Quickpen sent to [facility] in [place], [place]     Please as soon as possible..[phone]    Thank You

## 2020-10-05 NOTE — TELEPHONE ENCOUNTER
Pt stated high sugars with pump. Doing insulin shots currently and sugars have come down. Basaglar 16 units - based on 0.8 un/hr basal rate - increase to 19 units a day -might need 20un/day.   Instructed to call Tandem to go through self-test to see if pump is delivering insulin correctly.   Pt verbalized understanding and feels more comfortable on shots for now.

## 2020-10-06 ENCOUNTER — TELEPHONE (OUTPATIENT)
Dept: FAMILY MEDICINE CLINIC | Facility: CLINIC | Age: 30
End: 2020-10-06

## 2020-10-07 ENCOUNTER — DOCUMENTATION (OUTPATIENT)
Dept: ENDOCRINOLOGY | Facility: CLINIC | Age: 30
End: 2020-10-07

## 2020-10-12 ENCOUNTER — TELEPHONE (OUTPATIENT)
Dept: ENDOCRINOLOGY | Facility: CLINIC | Age: 30
End: 2020-10-12

## 2020-10-12 NOTE — TELEPHONE ENCOUNTER
Pt needs PA for Novalog pen and Basaglar Quick pen     Uses [facility] in  [place], [place]     Thank You

## 2020-10-13 ENCOUNTER — HOSPITAL ENCOUNTER (OUTPATIENT)
Dept: PAIN MANAGEMENT | Age: 30
Discharge: HOME | End: 2020-10-13
Payer: MEDICAID

## 2020-10-13 VITALS
OXYGEN SATURATION: 96 % | HEIGHT: 66 IN | BODY MASS INDEX: 23.95 KG/M2 | TEMPERATURE: 98.4 F | WEIGHT: 149 LBS | HEART RATE: 77 BPM | SYSTOLIC BLOOD PRESSURE: 104 MMHG | DIASTOLIC BLOOD PRESSURE: 73 MMHG

## 2020-10-13 PROBLEM — M54.2 CHRONIC NECK PAIN: Status: ACTIVE | Noted: 2020-10-13

## 2020-10-13 PROBLEM — F11.11 HISTORY OF OPIOID ABUSE (HCC): Status: ACTIVE | Noted: 2020-10-13

## 2020-10-13 PROBLEM — M79.18 BILATERAL MYOFASCIAL PAIN: Status: ACTIVE | Noted: 2020-10-13

## 2020-10-13 PROBLEM — G44.229 CHRONIC TENSION-TYPE HEADACHE, NOT INTRACTABLE: Status: ACTIVE | Noted: 2020-10-13

## 2020-10-13 PROBLEM — F12.20 TETRAHYDROCANNABINOL (THC) DEPENDENCE (HCC): Status: ACTIVE | Noted: 2020-10-13

## 2020-10-13 PROBLEM — G89.29 CHRONIC NECK PAIN: Status: ACTIVE | Noted: 2020-10-13

## 2020-10-13 PROCEDURE — 99204 OFFICE O/P NEW MOD 45 MIN: CPT | Performed by: NURSE PRACTITIONER

## 2020-10-13 PROCEDURE — 99215 OFFICE O/P EST HI 40 MIN: CPT

## 2020-10-13 PROCEDURE — G0463 HOSPITAL OUTPT CLINIC VISIT: HCPCS

## 2020-10-13 RX ORDER — INSULIN ASPART 100 [IU]/ML
20 INJECTION, SOLUTION INTRAVENOUS; SUBCUTANEOUS 3 TIMES DAILY PRN
COMMUNITY
Start: 2020-10-05

## 2020-10-13 RX ORDER — METHOCARBAMOL 750 MG/1
750 TABLET, FILM COATED ORAL 3 TIMES DAILY
Qty: 90 TABLET | Refills: 2 | Status: SHIPPED | OUTPATIENT
Start: 2020-10-13 | End: 2021-01-11

## 2020-10-13 RX ORDER — PROPRANOLOL HYDROCHLORIDE 20 MG/1
20 TABLET ORAL NIGHTLY
Qty: 90 TABLET | Refills: 2 | Status: SHIPPED | OUTPATIENT
Start: 2020-10-13 | End: 2021-05-17

## 2020-10-13 ASSESSMENT — PAIN DESCRIPTION - PAIN TYPE: TYPE: CHRONIC PAIN

## 2020-10-13 ASSESSMENT — PAIN SCALES - GENERAL: PAINLEVEL_OUTOF10: 4

## 2020-10-13 ASSESSMENT — ENCOUNTER SYMPTOMS
CONSTIPATION: 0
BACK PAIN: 0

## 2020-10-13 ASSESSMENT — PAIN DESCRIPTION - ORIENTATION: ORIENTATION: UPPER

## 2020-10-13 ASSESSMENT — PAIN DESCRIPTION - LOCATION: LOCATION: NECK

## 2020-10-13 NOTE — PROGRESS NOTES
Nursing Admission Record    Current Issues / Falls / ER Visits:  New patient referral for neck pain    Opioids Prescribed: None    Was Medication Brought to Appt: N/A    Hx of any Neck/Back Surgeries? None    Is Patient currently taking a blood thinner?   None    MRI exams received in the past 2 years:  Yes MRI Cervical Spine       When 3/2020                                              Where Care Everywhere       Imaging on chart: Yes         Imaging records requested: No    CT exam received during the last 12 months: No       When na                                              Where na       Imaging on chart: No         Imaging records requested: No    X-ray exam received during the last 12 months: No       When na                                              Where na       Imaging on chart: No         Imaging records requested: No    Nerve Conduction Study/EMG:  No       When na                                              Where na       Imaging on chart: No         Imaging records requested: No    Physical therapy: Yes       When: 2020                        [facility] [address] [facility] [address] [facility]       When: na                        Where  na    Labs  Yes       When: 2020                                             Where [facility]    PEG Score: 4.7    Last PEG Score: N/A    Annual ORT Score: 16    Annual PHQ Score: 9    Last UDS Results: N/A    Education Provided:  [x] Review of Wing  [x] Agreement Review  [x] PEG Score Calculated [x] PHQ Score Calculated [x] ORT Score Calculated    [] Compliance Issues Discussed [] Cognitive Behavior Needs [x] Exercise [] Review of Test [] Financial Issues  [x] Tobacco/Alcohol Use Reviewed [x] Teaching [x] New Patient [] Picture Obtained    Physician Plan:  [] Outgoing Referral  [] Pharmacy Consult  [] Test Ordered [x] Prescription Ordered/Changed [] Blood Thinner Request Form  [] Obtained Test Results / Consult Notes  [] UDS due at next visit, verified per EPIC      [] Suspected Physical Abuse or Suicide Risk assessed - IF YES COMPLETE QUESTIONS BELOW    If any of the following questions are answered yes - contact attending physician for referral:    Has been considering harming self to escape stress, pain problems? [] YES  [] NO  Has a suicide plan? [] YES  [] NO  Has attempted suicide in the past?   [] YES  [] NO  Has a close friend or family member who committed suicide?   [] YES  [] NO    Assessment Completed by:  Electronically signed by Munira Callaway RN on 10/13/2020 at 8:07 AM
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
## 2020-11-04 ENCOUNTER — HOSPITAL ENCOUNTER (OUTPATIENT)
Dept: PAIN MANAGEMENT | Age: 30
Discharge: HOME | End: 2020-11-04
Payer: MEDICAID

## 2020-11-04 VITALS
SYSTOLIC BLOOD PRESSURE: 119 MMHG | DIASTOLIC BLOOD PRESSURE: 70 MMHG | TEMPERATURE: 96.9 F | RESPIRATION RATE: 18 BRPM | OXYGEN SATURATION: 95 % | HEART RATE: 72 BPM

## 2020-11-04 PROCEDURE — 2500000003 HC RX 250 WO HCPCS

## 2020-11-04 PROCEDURE — 20553 NJX 1/MLT TRIGGER POINTS 3/>: CPT

## 2020-11-04 PROCEDURE — 20553 NJX 1/MLT TRIGGER POINTS 3/>: CPT | Performed by: NURSE PRACTITIONER

## 2020-11-04 RX ORDER — BUPIVACAINE HYDROCHLORIDE 5 MG/ML
10 INJECTION, SOLUTION EPIDURAL; INTRACAUDAL ONCE
Status: DISCONTINUED | OUTPATIENT
Start: 2020-11-04 | End: 2020-11-06 | Stop reason: HOSPADM

## 2020-11-04 RX ORDER — BUPIVACAINE HYDROCHLORIDE 5 MG/ML
15 INJECTION, SOLUTION EPIDURAL; INTRACAUDAL ONCE
Status: DISCONTINUED | OUTPATIENT
Start: 2020-11-04 | End: 2020-11-04

## 2020-11-04 RX ORDER — LIDOCAINE HYDROCHLORIDE 10 MG/ML
15 INJECTION, SOLUTION EPIDURAL; INFILTRATION; INTRACAUDAL; PERINEURAL ONCE
Status: DISCONTINUED | OUTPATIENT
Start: 2020-11-04 | End: 2020-11-04

## 2020-11-04 RX ORDER — LIDOCAINE HYDROCHLORIDE 10 MG/ML
10 INJECTION, SOLUTION EPIDURAL; INFILTRATION; INTRACAUDAL; PERINEURAL ONCE
Status: DISCONTINUED | OUTPATIENT
Start: 2020-11-04 | End: 2020-11-06 | Stop reason: HOSPADM

## 2020-11-04 NOTE — PROCEDURES
[facility]    Patient Name: Roselyn Garg    : 1990                    Age: 26 y.o. Sex: female    Date: 2020    Pre-op Diagnosis: Myofascial Pain/ Muscle Spasms/ Cervicalgia    Post-op Diagnosis: Myofascial Pain/ Muscle Spasms/ Cervicalgia    Procedure: Cervical Trigger Point Injections    Performing Procedure: Kiko Wolff ACAGNP - BC, [facility]    Patient Vitals for the past 24 hrs:   BP Temp Temp src Pulse Resp SpO2   20 0814 119/70 96.9 °F (36.1 °C) Temporal 72 18 95 %       Description of Procedure:    After a brief physical assessment and failure to improve with conservative measures the patient presented for Cervical Trigger Point Injections The indications, limitations and possible complications were discussed with the patient and the patient elected to proceed with the procedure. After voluntary, informed and signed consent obtained the patient was placed in a seated position. Appropriate time out was obtained per policy. The area of maximal tenderness was palpated over the  bilaterally Cervical muscles - Splenius  Trapezius  Rhomboid The skin overlying these areas was marked with a skin marker. The skin overlying the proposed injection sites were then sprayed with Gebauer's Solution while protecting patient eyes. The areas were prepped using aseptic technique with CHG prep.  Each trigger point of the Cervical muscles - Splenius  Trapezius  Rhomboid was injected with approximately 1- 2 ml of a solution of 5 ml of 1% Lidocaine Plain and 5 ml of 0.5% Marcaine Plain after negative aspiration    Pre-op Diagnosis: Myofascial Pain/ Muscle Spasms    Post-op Diagnosis: Myofascial Pain/ Muscle Spasms    Procedure: Thoracic Trigger Point Injections     Performing Procedure: OSIRIS Gandhi - BC, [facility]    Patient Vitals for the past 24 hrs:   BP Temp Temp src Pulse Resp SpO2   20 0814 119/70 96.9 °F (36.1 °C) Temporal 72 18 95 %       Description of Procedure:    After a brief physical assessment and failure to improve with conservative measures the patient presented for Thoracic Trigger Point Injections The indications, limitations and possible complications were discussed with the patient and the patient elected to proceed with the procedure. After voluntary, informed and signed consent obtained the patient was placed in a seated position. Appropriate time out was obtained per policy. The areas were then prepped in a sterile fashion with Chloro-Prep. The area of maximal tenderness was palpated over the bilaterally Thoracic Muscles - Erector Spinae, Upper/Mid Latissimus, Rhomboid Minor, Rhomboid Major. The skin overlying these areas was marked with a skin marker. The areas were prepped using aseptic technique with CHG prep. The skin overlying the proposed injection sites were then sprayed with Gebauer's Solution while protecting patient eyes. Each trigger point of the Thoracic Muscles - Erector Spinae, Upper/Mid Latissimus, Rhomboid Minor, Rhomboid Major was injected after negative aspiration was injected with approximately 1-2 ml of a solution of 5 ml of 1% Lidocaine Plain and 5 ml of 0.5% Marcaine Plain after negative aspiration    Discharge: The patient tolerated the procedure well. There were no complications during the procedure and the patient was discharged home with discharge instructions. The patient has been instructed to contact the office should there be any complications or questions to arise between today and their next appointment. Plan:  Patient to follow up as office appointment in 6 weeks.      ZBIGNIEW Siddiqui - CNP, 11/4/2020 at 9:05 AM

## 2020-11-10 ENCOUNTER — TELEPHONE (OUTPATIENT)
Dept: PAIN MANAGEMENT | Age: 30
End: 2020-11-10

## 2020-11-10 NOTE — TELEPHONE ENCOUNTER
Spoke with pt concerning her injections she had on 11/04. Pt states she is doing ok from her injections, but the first few days she was more achy. She did use ice on the injection sites which did help some. Pt states she is better now.    Pt has not further questions

## 2020-11-20 RX ORDER — IBUPROFEN 600 MG/1
1 TABLET ORAL ONCE AS NEEDED
Qty: 1 KIT | Refills: 6 | Status: SHIPPED | OUTPATIENT
Start: 2020-11-20

## 2020-11-24 ENCOUNTER — TELEPHONE (OUTPATIENT)
Dept: ENDOCRINOLOGY | Facility: CLINIC | Age: 30
End: 2020-11-24

## 2020-11-24 NOTE — TELEPHONE ENCOUNTER
Pt left a vm stating that she is needing to know what to do, she said for the last 4 days she has been waking up with sugar levels in the 400's.

## 2020-11-24 NOTE — TELEPHONE ENCOUNTER
Spoke with pt. She feels nauseous - explained that could be from hyperglycemia. She also stated she feels like a truck ran over her. Informed to be aware of flu and covid. She thinks she has a stomach bug or feels back from hyperglycemia. Also explained on sick days blood sugar usually goes up so insulin requirements are increased. Pt verbalized understanding.

## 2020-12-03 ENCOUNTER — OFFICE VISIT (OUTPATIENT)
Dept: ENDOCRINOLOGY | Facility: CLINIC | Age: 30
End: 2020-12-03

## 2020-12-03 ENCOUNTER — LAB (OUTPATIENT)
Dept: LAB | Facility: HOSPITAL | Age: 30
End: 2020-12-03

## 2020-12-03 VITALS
WEIGHT: 149.1 LBS | SYSTOLIC BLOOD PRESSURE: 104 MMHG | HEART RATE: 79 BPM | DIASTOLIC BLOOD PRESSURE: 76 MMHG | HEIGHT: 67 IN | OXYGEN SATURATION: 98 % | BODY MASS INDEX: 23.4 KG/M2

## 2020-12-03 DIAGNOSIS — E10.65 TYPE 1 DIABETES MELLITUS WITH HYPERGLYCEMIA (HCC): Primary | ICD-10-CM

## 2020-12-03 DIAGNOSIS — F17.200 SMOKING: ICD-10-CM

## 2020-12-03 DIAGNOSIS — E55.9 VITAMIN D DEFICIENCY: ICD-10-CM

## 2020-12-03 LAB
25(OH)D3 SERPL-MCNC: 31.8 NG/ML (ref 30–100)
ALBUMIN SERPL-MCNC: 5.4 G/DL (ref 3.5–5.2)
ALBUMIN/GLOB SERPL: 2 G/DL
ALP SERPL-CCNC: 80 U/L (ref 39–117)
ALT SERPL W P-5'-P-CCNC: 11 U/L (ref 1–33)
ANION GAP SERPL CALCULATED.3IONS-SCNC: 11.3 MMOL/L (ref 5–15)
AST SERPL-CCNC: 17 U/L (ref 1–32)
BASOPHILS # BLD AUTO: 0.12 10*3/MM3 (ref 0–0.2)
BASOPHILS NFR BLD AUTO: 0.9 % (ref 0–1.5)
BILIRUB SERPL-MCNC: 0.3 MG/DL (ref 0–1.2)
BUN SERPL-MCNC: 14 MG/DL (ref 6–20)
BUN/CREAT SERPL: 17.3 (ref 7–25)
CALCIUM SPEC-SCNC: 10.1 MG/DL (ref 8.6–10.5)
CHLORIDE SERPL-SCNC: 100 MMOL/L (ref 98–107)
CO2 SERPL-SCNC: 27.7 MMOL/L (ref 22–29)
CREAT SERPL-MCNC: 0.81 MG/DL (ref 0.57–1)
DEPRECATED RDW RBC AUTO: 37.5 FL (ref 37–54)
EOSINOPHIL # BLD AUTO: 0.4 10*3/MM3 (ref 0–0.4)
EOSINOPHIL NFR BLD AUTO: 3 % (ref 0.3–6.2)
ERYTHROCYTE [DISTWIDTH] IN BLOOD BY AUTOMATED COUNT: 11.8 % (ref 12.3–15.4)
GFR SERPL CREATININE-BSD FRML MDRD: 82 ML/MIN/1.73
GLOBULIN UR ELPH-MCNC: 2.7 GM/DL
GLUCOSE SERPL-MCNC: 111 MG/DL (ref 65–99)
HBA1C MFR BLD: 8.6 % (ref 4.8–5.6)
HCT VFR BLD AUTO: 44.4 % (ref 34–46.6)
HGB BLD-MCNC: 15.1 G/DL (ref 12–15.9)
IMM GRANULOCYTES # BLD AUTO: 0.05 10*3/MM3 (ref 0–0.05)
IMM GRANULOCYTES NFR BLD AUTO: 0.4 % (ref 0–0.5)
LYMPHOCYTES # BLD AUTO: 2.27 10*3/MM3 (ref 0.7–3.1)
LYMPHOCYTES NFR BLD AUTO: 16.8 % (ref 19.6–45.3)
MCH RBC QN AUTO: 30 PG (ref 26.6–33)
MCHC RBC AUTO-ENTMCNC: 34 G/DL (ref 31.5–35.7)
MCV RBC AUTO: 88.3 FL (ref 79–97)
MONOCYTES # BLD AUTO: 0.85 10*3/MM3 (ref 0.1–0.9)
MONOCYTES NFR BLD AUTO: 6.3 % (ref 5–12)
NEUTROPHILS NFR BLD AUTO: 72.6 % (ref 42.7–76)
NEUTROPHILS NFR BLD AUTO: 9.84 10*3/MM3 (ref 1.7–7)
NRBC BLD AUTO-RTO: 0 /100 WBC (ref 0–0.2)
PLATELET # BLD AUTO: 328 10*3/MM3 (ref 140–450)
PMV BLD AUTO: 10.9 FL (ref 6–12)
POTASSIUM SERPL-SCNC: 4.4 MMOL/L (ref 3.5–5.2)
PROT SERPL-MCNC: 8.1 G/DL (ref 6–8.5)
RBC # BLD AUTO: 5.03 10*6/MM3 (ref 3.77–5.28)
SODIUM SERPL-SCNC: 139 MMOL/L (ref 136–145)
TSH SERPL DL<=0.05 MIU/L-ACNC: 0.6 UIU/ML (ref 0.27–4.2)
VIT B12 BLD-MCNC: 925 PG/ML (ref 211–946)
WBC # BLD AUTO: 13.53 10*3/MM3 (ref 3.4–10.8)

## 2020-12-03 PROCEDURE — 95251 CONT GLUC MNTR ANALYSIS I&R: CPT | Performed by: NURSE PRACTITIONER

## 2020-12-03 PROCEDURE — 99214 OFFICE O/P EST MOD 30 MIN: CPT | Performed by: NURSE PRACTITIONER

## 2020-12-03 PROCEDURE — 84443 ASSAY THYROID STIM HORMONE: CPT | Performed by: NURSE PRACTITIONER

## 2020-12-03 PROCEDURE — 83036 HEMOGLOBIN GLYCOSYLATED A1C: CPT | Performed by: NURSE PRACTITIONER

## 2020-12-03 PROCEDURE — 82607 VITAMIN B-12: CPT | Performed by: NURSE PRACTITIONER

## 2020-12-03 PROCEDURE — 85025 COMPLETE CBC W/AUTO DIFF WBC: CPT | Performed by: NURSE PRACTITIONER

## 2020-12-03 PROCEDURE — 80053 COMPREHEN METABOLIC PANEL: CPT | Performed by: NURSE PRACTITIONER

## 2020-12-03 PROCEDURE — 36415 COLL VENOUS BLD VENIPUNCTURE: CPT | Performed by: NURSE PRACTITIONER

## 2020-12-03 PROCEDURE — 82306 VITAMIN D 25 HYDROXY: CPT | Performed by: NURSE PRACTITIONER

## 2020-12-03 NOTE — PROGRESS NOTES
Subjective    Ella Olivera is a 30 y.o. female. she is here today for follow-up.    History of Present Illness     IN OFFICE VISIT      30 year old female presents for follow up     Reason diabetes mellitus type 1    Duration diagnosed in 2017    Timing constant    Quality not controlled       Severity moderate              Severity (Complication/Hospitalizations)        Secondary Macrovascular - no CVA, no PAD, no CAD        Secondary Microvascular--- neuropathy        Context-- presented to ER for fatigue, difficulty waking up and blood sugar was over 700          Diabetes Regimen--insulin by injections          off pump         Quantity     Lab Results   Component Value Date    HGBA1C 10.00 (H) 09/03/2020                Blood Glucose Readings     Checks sugars 6 times daily and has been for the last 90 days before dexcom      Has the dexcom now and able to monitor more frequently and having less hypoglycemia     Off pump         Dexcom G6                           Diet-   watching carb         Associated Signs/Symptoms       Hyperglycemic Symptoms:none       Hypoglycemic Episodes:  Documented in the past but less since wearing the Dexcom            The following portions of the patient's history were reviewed and updated as appropriate:   Past Medical History:   Diagnosis Date   • Allergic rhinitis    • Backache    • Cyst of ovary    • Endometriosis     clinical   • Endometriosis of pelvic peritoneum     h/o   • Genital warts     h/o   • Irregular periods    • Left lower quadrant pain    • Pelvic and perineal pain     has bladder tenderness, uterine tenderness, and left adnexal tenderness   • Urethritis    • Urinary tract infectious disease      Past Surgical History:   Procedure Laterality Date   • APPENDECTOMY     • CHOLECYSTECTOMY     • DENTAL PROCEDURE      wisdom teeth extraction   • DIAGNOSTIC LAPAROSCOPY  12/27/2007    exam under anesthesia, diagnostic laparoscopy. lower abdominal pain of  uncertain etiology. stage II endometriosis.   • TONSILLECTOMY  2002    chronic tonsillitis     Family History   Problem Relation Age of Onset   • Diabetes Other    • Hypertension Other    • No Known Problems Mother    • No Known Problems Father    • No Known Problems Sister    • No Known Problems Brother      OB History        6    Para   1    Term           1    AB   5    Living   1       SAB   5    TAB        Ectopic        Molar        Multiple        Live Births                  Current Outpatient Medications   Medication Sig Dispense Refill   • busPIRone (BUSPAR) 15 MG tablet Take 15 mg by mouth Daily.     • FLUoxetine (PROzac) 20 MG capsule Take 20 mg by mouth Daily.  3   • gabapentin (NEURONTIN) 400 MG capsule As Needed.     • glucagon (Glucagon Emergency) 1 MG injection Inject 1 mg under the skin into the appropriate area as directed 1 (One) Time As Needed for Low Blood Sugar for up to 1 dose. 1 kit 6   • insulin aspart (NovoLOG FlexPen) 100 UNIT/ML solution pen-injector sc pen Inject 4 up to 20 units TID with meals based on carb count 5 pen 11   • insulin aspart (NovoLOG) 100 UNIT/ML injection Can use up to 90 units daily via pump. 30 mL 11   • Insulin Glargine (BASAGLAR KWIKPEN) 100 UNIT/ML injection pen Inject 20 units per day. 5 pen 5   • Insulin Glargine (BASAGLAR KWIKPEN) 100 UNIT/ML injection pen Inject 20 Units under the skin into the appropriate area as directed Daily. 3 pen 11   • insulin lispro (humaLOG) 100 UNIT/ML injection Use up to 90 units through pump daily 30 mL 11   • Insulin Pen Needle (Pure Comfort Pen Needle) 32G X 4 MM misc Inject 4 times daily, E10.65 120 each 11   • Prenatal Multivit-Min-Fe-FA (PRENATAL 1 + IRON PO) Take  by mouth Daily.     • VENTOLIN  (90 Base) MCG/ACT inhaler Inhale 2 puffs Every 4 (Four) Hours As Needed for Wheezing. 9 g 11     No current facility-administered medications for this visit.      Allergies   Allergen Reactions   • Aspirin   "  • Cephalexin Nausea And Vomiting   • Penicillins    • Reglan [Metoclopramide]      Social History     Socioeconomic History   • Marital status:      Spouse name: Not on file   • Number of children: Not on file   • Years of education: Not on file   • Highest education level: Not on file   Tobacco Use   • Smoking status: Current Every Day Smoker     Types: Cigarettes   • Smokeless tobacco: Never Used   Substance and Sexual Activity   • Alcohol use: No   • Drug use: Defer     Comment: past use   • Sexual activity: Defer     Partners: Male     Birth control/protection: None       Review of Systems  Review of Systems   Constitutional: Negative for activity change, appetite change, diaphoresis and fatigue.   HENT: Negative for facial swelling, sneezing, sore throat, tinnitus, trouble swallowing and voice change.    Eyes: Negative for photophobia, pain, discharge, redness, itching and visual disturbance.   Respiratory: Negative for apnea, cough, choking, chest tightness and shortness of breath.    Cardiovascular: Negative for chest pain, palpitations and leg swelling.   Gastrointestinal: Negative for abdominal distention, abdominal pain, constipation, diarrhea, nausea and vomiting.   Endocrine: Negative for cold intolerance, heat intolerance, polydipsia, polyphagia and polyuria.   Genitourinary: Negative for difficulty urinating, dysuria, frequency, hematuria and urgency.   Musculoskeletal: Negative for arthralgias, back pain, gait problem, joint swelling, myalgias, neck pain and neck stiffness.   Skin: Negative for color change, pallor, rash and wound.   Neurological: Negative for dizziness, tremors, weakness, light-headedness, numbness and headaches.   Hematological: Negative for adenopathy. Does not bruise/bleed easily.   Psychiatric/Behavioral: Negative for behavioral problems, confusion and sleep disturbance.        Objective    /76   Pulse 79   Ht 168.9 cm (66.5\")   Wt 67.6 kg (149 lb 1.6 oz)   LMP " 12/03/2020 (Exact Date)   SpO2 98%   BMI 23.70 kg/m²   Physical Exam  Constitutional:       General: She is not in acute distress.     Appearance: She is well-developed.   HENT:      Head: Normocephalic and atraumatic.      Right Ear: External ear normal.      Left Ear: External ear normal.      Nose: Nose normal.   Eyes:      Conjunctiva/sclera: Conjunctivae normal.      Pupils: Pupils are equal, round, and reactive to light.   Neck:      Musculoskeletal: Normal range of motion and neck supple.      Thyroid: No thyromegaly.      Trachea: No tracheal deviation.   Cardiovascular:      Rate and Rhythm: Normal rate and regular rhythm.      Heart sounds: Normal heart sounds. No murmur.   Pulmonary:      Effort: Pulmonary effort is normal. No respiratory distress.      Breath sounds: Normal breath sounds. No wheezing.   Abdominal:      General: Bowel sounds are normal.      Palpations: Abdomen is soft.      Tenderness: There is no abdominal tenderness. There is no guarding or rebound.   Musculoskeletal: Normal range of motion.         General: No tenderness or deformity.   Skin:     General: Skin is warm and dry.      Findings: No rash.   Neurological:      Mental Status: She is alert and oriented to person, place, and time.      Cranial Nerves: No cranial nerve deficit.   Psychiatric:         Behavior: Behavior normal.         Thought Content: Thought content normal.         Judgment: Judgment normal.         Lab Review  Glucose (mg/dL)   Date Value   09/03/2020 308 (H)   09/17/2019 182 (H)   05/17/2019 203 (H)     Sodium (mmol/L)   Date Value   09/03/2020 136   09/17/2019 139   05/17/2019 142     Potassium (mmol/L)   Date Value   09/03/2020 4.0   09/17/2019 4.0   05/17/2019 3.9     Chloride (mmol/L)   Date Value   09/03/2020 101   09/17/2019 102   05/17/2019 105     CO2 (mmol/L)   Date Value   09/03/2020 25.4   09/17/2019 25.4   05/17/2019 25.2     BUN (mg/dL)   Date Value   09/03/2020 12   09/17/2019 14   05/17/2019  12     Creatinine (mg/dL)   Date Value   09/03/2020 0.79   09/17/2019 0.74   05/17/2019 0.72     Hemoglobin A1C   Date Value   09/03/2020 10.00 % (H)   02/04/2020 8.5   09/17/2019 8.90 % (H)   05/13/2019 9.46 % (H)     Triglycerides (mg/dL)   Date Value   09/03/2020 101     LDL Cholesterol  (mg/dL)   Date Value   09/03/2020 143 (H)       Assessment/Plan      1. Type 1 diabetes mellitus with hyperglycemia (CMS/[facility])    2. Vitamin D deficiency    3. Smoking    .    Medications prescribed:  Outpatient Encounter Medications as of 12/3/2020   Medication Sig Dispense Refill   • busPIRone (BUSPAR) 15 MG tablet Take 15 mg by mouth Daily.     • FLUoxetine (PROzac) 20 MG capsule Take 20 mg by mouth Daily.  3   • gabapentin (NEURONTIN) 400 MG capsule As Needed.     • glucagon (Glucagon Emergency) 1 MG injection Inject 1 mg under the skin into the appropriate area as directed 1 (One) Time As Needed for Low Blood Sugar for up to 1 dose. 1 kit 6   • insulin aspart (NovoLOG FlexPen) 100 UNIT/ML solution pen-injector sc pen Inject 4 up to 20 units TID with meals based on carb count 5 pen 11   • insulin aspart (NovoLOG) 100 UNIT/ML injection Can use up to 90 units daily via pump. 30 mL 11   • Insulin Glargine (BASAGLAR KWIKPEN) 100 UNIT/ML injection pen Inject 20 units per day. 5 pen 5   • Insulin Glargine (BASAGLAR KWIKPEN) 100 UNIT/ML injection pen Inject 20 Units under the skin into the appropriate area as directed Daily. 3 pen 11   • insulin lispro (humaLOG) 100 UNIT/ML injection Use up to 90 units through pump daily 30 mL 11   • Insulin Pen Needle (Pure Comfort Pen Needle) 32G X 4 MM misc Inject 4 times daily, E10.65 120 each 11   • Prenatal Multivit-Min-Fe-FA (PRENATAL 1 + IRON PO) Take  by mouth Daily.     • VENTOLIN  (90 Base) MCG/ACT inhaler Inhale 2 puffs Every 4 (Four) Hours As Needed for Wheezing. 9 g 11     No facility-administered encounter medications on file as of 12/3/2020.        Orders placed during this  encounter include:  Orders Placed This Encounter   Procedures   • Comprehensive Metabolic Panel   • Hemoglobin A1c   • TSH   • Vitamin B12   • Vitamin D 25 Hydroxy   • CBC Auto Differential   • CBC & Differential     Order Specific Question:   Manual Differential     Answer:   No     Glycemic Management        Diabetes mellitus type 1     Lab Results   Component Value Date    HGBA1C 10.00 (H) 09/03/2020          Dexcom G6                       She is running high overnight    Mealtime seems to be appropriate    We will adjust her basal insulin           Component      Latest Ref Rng & Units 5/17/2019   Islet Cell Ab      Neg:<1:1 Negative   BRENDA-65      0.0 - 5.0 U/mL 2607.1 (H)   C-Peptide      1.1 - 4.4 ng/mL 0.6 (L)                 Basaglar taking 22 units increase to 24 units            Novolog ----   ---      1 unit per 10 grams of CHO          plus sliding scale 1 per 50 above 150             off pump       Tandem insulin pump      Basal       0.70-           Carb ratio     10     Correction      40              Lipid Management        Patient is not on a statin              Total Cholesterol   Date Value Ref Range Status   09/03/2020 201 (H) 0 - 200 mg/dL Final     Triglycerides   Date Value Ref Range Status   09/03/2020 101 0 - 150 mg/dL Final     HDL Cholesterol   Date Value Ref Range Status   09/03/2020 38 (L) 40 - 60 mg/dL Final     LDL Cholesterol    Date Value Ref Range Status   09/03/2020 143 (H) 0 - 100 mg/dL Final        Blood Pressure Management     bp controlled not on ACEi               Microvascular Complication Monitoring        May 2018         microalbuminuria      Ratio - 33 High               Gabapentin 400 mg tid daily    Bone Health       Component      Latest Ref Rng & Units 9/3/2020   25 Hydroxy, Vitamin D      30.0 - 100.0 ng/ml 32.2      vitamin d def.      Taking vitamin d supplement         Other Diabetes Related Aspects            Lab Results   Component Value Date    [id number] 052  09/03/2020          Thyroid Health        Lab Results   Component Value Date    TSH 0.585 09/03/2020                   Weight Management:     Patient's Body mass index is 23.7 kg/m². BMI is within normal parameters. No follow-up required..                Preventive Care:     Patient is smoking         Ella Chery Jarod  reports that she has been smoking cigarettes. She has never used smokeless tobacco.. I have educated her on the risk of diseases from using tobacco products such as cancer, COPD and heart disease.     I advised her to quit and she is not willing to quit.    I spent 3  minutes counseling the patient.                4. Follow-up: Return in about 3 months (around 3/3/2021) for Recheck.

## 2020-12-04 ENCOUNTER — TELEPHONE (OUTPATIENT)
Dept: ENDOCRINOLOGY | Facility: CLINIC | Age: 30
End: 2020-12-04

## 2020-12-04 NOTE — TELEPHONE ENCOUNTER
Mailbox is full   ----- Message from LILLIE Esquivel sent at 12/4/2020  8:01 AM CST -----  A1c was 8.6 improved it was 10, thyroid , vitamin d and b12 normal

## 2020-12-23 ENCOUNTER — DOCUMENTATION (OUTPATIENT)
Dept: ENDOCRINOLOGY | Facility: CLINIC | Age: 30
End: 2020-12-23

## 2021-01-05 ENCOUNTER — HOSPITAL ENCOUNTER (OUTPATIENT)
Dept: PAIN MANAGEMENT | Age: 31
Discharge: HOME | End: 2021-01-05
Payer: MEDICAID

## 2021-01-05 VITALS
HEIGHT: 67 IN | WEIGHT: 145 LBS | DIASTOLIC BLOOD PRESSURE: 71 MMHG | HEART RATE: 90 BPM | RESPIRATION RATE: 16 BRPM | TEMPERATURE: 98.9 F | OXYGEN SATURATION: 97 % | BODY MASS INDEX: 22.76 KG/M2 | SYSTOLIC BLOOD PRESSURE: 102 MMHG

## 2021-01-05 DIAGNOSIS — R20.0 RIGHT ARM NUMBNESS: ICD-10-CM

## 2021-01-05 DIAGNOSIS — M62.81 MUSCLE RIGHT ARM WEAKNESS: ICD-10-CM

## 2021-01-05 PROCEDURE — 99214 OFFICE O/P EST MOD 30 MIN: CPT

## 2021-01-05 PROCEDURE — 99214 OFFICE O/P EST MOD 30 MIN: CPT | Performed by: NURSE PRACTITIONER

## 2021-01-05 PROCEDURE — G0463 HOSPITAL OUTPT CLINIC VISIT: HCPCS

## 2021-01-05 ASSESSMENT — ENCOUNTER SYMPTOMS
BACK PAIN: 0
CONSTIPATION: 0

## 2021-01-05 ASSESSMENT — PAIN DESCRIPTION - PAIN TYPE: TYPE: CHRONIC PAIN

## 2021-01-05 ASSESSMENT — PAIN DESCRIPTION - ORIENTATION: ORIENTATION: UPPER
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
## 2021-01-05 NOTE — PROGRESS NOTES
Clinic Documentation      Education Provided:  [x] Review of [place] [facility]  [] Agreement Review  [x] PEG Score Calculated [] PHQ Score Calculated [] ORT Score Calculated    [] Compliance Issues Discussed [] Cognitive Behavior Needs [x] Exercise [] Review of Test [] Financial Issues  [x] Tobacco/Alcohol Use Reviewed [x] Teaching [] New Patient [] Picture Obtained    Physician Plan:  [] Outgoing Referral  [] Pharmacy Consult  [x] Test Ordered [x] Prescription Ordered/Changed   [] Obtained Test Results / Consult Notes        Complete if patient is withholding blood thinner for procedure     Blood Thinner Patient is currently taking:      [] Plavix (Hold for 7 days)  [] Aspirin (Hold for 5 days)     [] Pletal (Hold for 2 days)  [] Pradaxa (Hold for 3 days)    [] Effient (Hold for 7 days)  [] Xarelto (Hold for 2 days)    [] Eliquis (Hold for 2 days)  [] Brilinta (Hold for 7 days)    [] Coumadin (Hold for 5 days) - (INR needs to be drawn the day prior to procedure- INR < 2.0)    [] Aggrenox (Hold for 7 days)        [] Patient will stop medication on their own.    [] Blood Thinner Form Faxed for approval to hold.    Provider form faxed to:     Assessment Completed by:  Electronically signed by Mercy Latif RN on 1/5/2021 at 2:24 PM

## 2021-01-20 ENCOUNTER — TELEPHONE (OUTPATIENT)
Dept: PAIN MANAGEMENT | Age: 31
End: 2021-01-20

## 2021-01-20 ENCOUNTER — HOSPITAL ENCOUNTER (OUTPATIENT)
Dept: NEUROLOGY | Age: 31
Discharge: HOME | End: 2021-01-20
Payer: MEDICAID

## 2021-01-20 DIAGNOSIS — M62.81 MUSCLE RIGHT ARM WEAKNESS: ICD-10-CM

## 2021-01-20 PROCEDURE — 95909 NRV CNDJ TST 5-6 STUDIES: CPT

## 2021-01-20 PROCEDURE — 95909 NRV CNDJ TST 5-6 STUDIES: CPT | Performed by: PSYCHIATRY & NEUROLOGY

## 2021-01-20 PROCEDURE — 95886 MUSC TEST DONE W/N TEST COMP: CPT

## 2021-01-20 PROCEDURE — 95886 MUSC TEST DONE W/N TEST COMP: CPT | Performed by: PSYCHIATRY & NEUROLOGY

## 2021-02-02 ENCOUNTER — TELEPHONE (OUTPATIENT)
Dept: ENDOCRINOLOGY | Facility: CLINIC | Age: 31
End: 2021-02-02

## 2021-02-02 ENCOUNTER — DOCUMENTATION (OUTPATIENT)
Dept: ENDOCRINOLOGY | Facility: CLINIC | Age: 31
End: 2021-02-02

## 2021-02-02 NOTE — TELEPHONE ENCOUNTER
Pt juan barroso stating that [facility] faxed us some paperwork, they are wanting to know if we can refax this back to them.

## 2021-03-03 ENCOUNTER — LAB (OUTPATIENT)
Dept: LAB | Facility: HOSPITAL | Age: 31
End: 2021-03-03

## 2021-03-03 ENCOUNTER — OFFICE VISIT (OUTPATIENT)
Dept: ENDOCRINOLOGY | Facility: CLINIC | Age: 31
End: 2021-03-03

## 2021-03-03 VITALS
WEIGHT: 143.7 LBS | SYSTOLIC BLOOD PRESSURE: 104 MMHG | HEART RATE: 75 BPM | HEIGHT: 67 IN | DIASTOLIC BLOOD PRESSURE: 60 MMHG | BODY MASS INDEX: 22.55 KG/M2 | OXYGEN SATURATION: 99 %

## 2021-03-03 DIAGNOSIS — E10.65 TYPE 1 DIABETES MELLITUS WITH HYPERGLYCEMIA (HCC): Primary | ICD-10-CM

## 2021-03-03 DIAGNOSIS — F17.200 SMOKING: ICD-10-CM

## 2021-03-03 DIAGNOSIS — E55.9 VITAMIN D DEFICIENCY: ICD-10-CM

## 2021-03-03 PROCEDURE — 82607 VITAMIN B-12: CPT | Performed by: NURSE PRACTITIONER

## 2021-03-03 PROCEDURE — 99214 OFFICE O/P EST MOD 30 MIN: CPT | Performed by: NURSE PRACTITIONER

## 2021-03-03 PROCEDURE — 95251 CONT GLUC MNTR ANALYSIS I&R: CPT | Performed by: NURSE PRACTITIONER

## 2021-03-03 PROCEDURE — 84156 ASSAY OF PROTEIN URINE: CPT | Performed by: NURSE PRACTITIONER

## 2021-03-03 PROCEDURE — 36415 COLL VENOUS BLD VENIPUNCTURE: CPT | Performed by: NURSE PRACTITIONER

## 2021-03-03 PROCEDURE — 84443 ASSAY THYROID STIM HORMONE: CPT | Performed by: NURSE PRACTITIONER

## 2021-03-03 PROCEDURE — 82043 UR ALBUMIN QUANTITATIVE: CPT | Performed by: NURSE PRACTITIONER

## 2021-03-03 PROCEDURE — 82570 ASSAY OF URINE CREATININE: CPT | Performed by: NURSE PRACTITIONER

## 2021-03-03 PROCEDURE — 80053 COMPREHEN METABOLIC PANEL: CPT | Performed by: NURSE PRACTITIONER

## 2021-03-03 PROCEDURE — 85025 COMPLETE CBC W/AUTO DIFF WBC: CPT | Performed by: NURSE PRACTITIONER

## 2021-03-03 PROCEDURE — 82306 VITAMIN D 25 HYDROXY: CPT | Performed by: NURSE PRACTITIONER

## 2021-03-03 PROCEDURE — 80061 LIPID PANEL: CPT | Performed by: NURSE PRACTITIONER

## 2021-03-03 PROCEDURE — 83036 HEMOGLOBIN GLYCOSYLATED A1C: CPT | Performed by: NURSE PRACTITIONER

## 2021-03-03 RX ORDER — DULOXETIN HYDROCHLORIDE 30 MG/1
1 CAPSULE, DELAYED RELEASE ORAL DAILY
COMMUNITY
Start: 2021-02-22 | End: 2021-10-11

## 2021-03-03 RX ORDER — CETIRIZINE HYDROCHLORIDE 10 MG/1
1 TABLET ORAL DAILY
COMMUNITY
Start: 2021-02-11

## 2021-03-03 RX ORDER — PROPRANOLOL HYDROCHLORIDE 20 MG/1
1 TABLET ORAL NIGHTLY
COMMUNITY
Start: 2021-02-11 | End: 2022-05-04

## 2021-03-03 RX ORDER — METHOCARBAMOL 750 MG/1
1 TABLET, FILM COATED ORAL 3 TIMES DAILY
COMMUNITY
Start: 2021-02-11 | End: 2022-05-04

## 2021-03-03 NOTE — PROGRESS NOTES
"Chief Complaint  Diabetes    Subjective          Ella Olivera presents to [facility] ENDOCRINOLOGY  History of Present Illness        In office visit     31 year old female presents for follow up     Reason diabetes mellitus type 1      Duration diagnosed in 2017     Timing constant     Quality not controlled      Severity moderate            Severity (Complication/Hospitalizations)        Secondary Macrovascular - none           Secondary Microvascular--- neuropathy        Context-- presented to ER for fatigue, difficulty waking up and blood sugar was over 700          Diabetes Regimen--insulin by injections                Quantity     Lab Results   Component Value Date    HGBA1C 8.60 (H) 12/03/2020                Blood Glucose Readings     Checks sugars 6 times daily and has been for the last 90 days before dexcom      Has the dexcom now and able to monitor more frequently and having less hypoglycemia      Off pump         Dexcom G6                Objective   Vital Signs:   /60   Pulse 75   Ht 168.9 cm (66.5\")   Wt 65.2 kg (143 lb 11.2 oz)   SpO2 99%   BMI 22.85 kg/m²     Physical Exam   Result Review :   The following data was reviewed by: LILLIE Esquivel on 03/03/2021:  Common labs    Common Labsle 9/3/20 9/3/20 9/3/20 9/3/20 9/3/20 12/3/20 12/3/20 12/3/20    1141 1141 1141 1141 1141 1207 1207 1207   Glucose   308 (A)     111 (A)   BUN   12     14   Creatinine   0.79     0.81   eGFR Non African Am   84     82   Sodium   136     139   Potassium   4.0     4.4   Chloride   101     100   Calcium   9.3     10.1   Albumin   4.60     5.40 (A)   Total Bilirubin   0.2     0.3   Alkaline Phosphatase   87     80   AST (SGOT)   15     17   ALT (SGPT)   10     11   WBC 11.35 (A)     13.53 (A)     Hemoglobin 14.9     15.1     Hematocrit 44.7     44.4     Platelets 284     328     Total Cholesterol    201 (A)       Triglycerides    101       HDL Cholesterol    38 (A)       LDL " Cholesterol     143 (A)       Hemoglobin A1C  10.00 (A)     8.60 (A)    Microalbumin, Urine     <1.2      (A) Abnormal value                      Assessment and Plan    Diagnoses and all orders for this visit:    1. Type 1 diabetes mellitus with hyperglycemia (CMS/[facility]) (Primary)  -     CBC & Differential  -     Comprehensive Metabolic Panel  -     Hemoglobin A1c  -     TSH  -     Vitamin B12  -     Vitamin D 25 Hydroxy  -     Protein / Creatinine Ratio, Urine - Urine, Clean Catch  -     Microalbumin / Creatinine Urine Ratio - Urine, Clean Catch  -     Lipid Panel  -     CBC Auto Differential    2. Vitamin D deficiency  -     CBC & Differential  -     Comprehensive Metabolic Panel  -     Hemoglobin A1c  -     TSH  -     Vitamin B12  -     Vitamin D 25 Hydroxy  -     Protein / Creatinine Ratio, Urine - Urine, Clean Catch  -     Microalbumin / Creatinine Urine Ratio - Urine, Clean Catch  -     Lipid Panel  -     CBC Auto Differential    3. Smoking                 She is running high overnight     Mealtime seems to be appropriate     We will adjust her basal insulin              Component      Latest Ref Rng & Units 5/17/2019   Islet Cell Ab      Neg:<1:1 Negative   BRENDA-65      0.0 - 5.0 U/mL 2607.1 (H)   C-Peptide      1.1 - 4.4 ng/mL 0.6 (L)                      Reviewed and images are above    Based on her best day with an average of 138 and 87% in range did not recommend any changes to her regimen    She does have trouble on her week of her period where she runs high so on that week she will do 1-8 on her carb count and she will take 26 units of Basaglar otherwise keep with current regimen        Basaglar taking  24 units            Novolog ----   ---      1 unit per 10 grams of CHO          plus sliding scale 1 per 50 above 150             off pump       Tandem insulin pump      Basal       0.70-            Carb ratio     10     Correction      40               Lipid Management        Patient is not on a statin             Total Cholesterol   Date Value Ref Range Status   09/03/2020 201 (H) 0 - 200 mg/dL Final     Triglycerides   Date Value Ref Range Status   09/03/2020 101 0 - 150 mg/dL Final     HDL Cholesterol   Date Value Ref Range Status   09/03/2020 38 (L) 40 - 60 mg/dL Final     LDL Cholesterol    Date Value Ref Range Status   09/03/2020 143 (H) 0 - 100 mg/dL Final           Blood Pressure Management     bp controlled not on ACEi               Microvascular Complication Monitoring        May 2018         microalbuminuria      Ratio - 33 High               Taking Gabapentin 400 mg tid daily            Bone Health        Component      Latest Ref Rng & Units 9/3/2020   25 Hydroxy, Vitamin D      30.0 - 100.0 ng/ml 32.2       vitamin d def.        Taking vitamin d supplement            Other Diabetes Related Aspects                  Lab Results   Component Value Date     [id number] 742 09/03/2020            Thyroid Health              Lab Results   Component Value Date     TSH 0.585 09/03/2020                     Weight Management:     Patient's Body mass index is 22.85 kg/m². BMI is within normal parameters. No follow-up required..                Preventive Care:     Patient is smoking         Ella Chery Olivera  reports that she has been smoking cigarettes. She has never used smokeless tobacco.. I have educated her on the risk of diseases from using tobacco products such as cancer, COPD and heart disease.      I advised her to quit and she is not willing to quit.     I spent 3  minutes counseling the patient.               Follow Up   Return in about 3 months (around 6/3/2021) for Recheck.  Patient was given instructions and counseling regarding her condition or for health maintenance advice. Please see specific information pulled into the AVS if appropriate.

## 2021-03-04 LAB
25(OH)D3 SERPL-MCNC: 28.1 NG/ML (ref 30–100)
ALBUMIN SERPL-MCNC: 4.7 G/DL (ref 3.5–5.2)
ALBUMIN UR-MCNC: <1.2 MG/DL
ALBUMIN/GLOB SERPL: 1.5 G/DL
ALP SERPL-CCNC: 73 U/L (ref 39–117)
ALT SERPL W P-5'-P-CCNC: 13 U/L (ref 1–33)
ANION GAP SERPL CALCULATED.3IONS-SCNC: 9.1 MMOL/L (ref 5–15)
AST SERPL-CCNC: 22 U/L (ref 1–32)
BASOPHILS # BLD AUTO: 0.09 10*3/MM3 (ref 0–0.2)
BASOPHILS NFR BLD AUTO: 1 % (ref 0–1.5)
BILIRUB SERPL-MCNC: 0.3 MG/DL (ref 0–1.2)
BUN SERPL-MCNC: 12 MG/DL (ref 6–20)
BUN/CREAT SERPL: 15.2 (ref 7–25)
CALCIUM SPEC-SCNC: 9.6 MG/DL (ref 8.6–10.5)
CHLORIDE SERPL-SCNC: 102 MMOL/L (ref 98–107)
CHOLEST SERPL-MCNC: 196 MG/DL (ref 0–200)
CO2 SERPL-SCNC: 27.9 MMOL/L (ref 22–29)
CREAT SERPL-MCNC: 0.79 MG/DL (ref 0.57–1)
CREAT UR-MCNC: 91.6 MG/DL
CREAT UR-MCNC: 91.6 MG/DL
DEPRECATED RDW RBC AUTO: 37.8 FL (ref 37–54)
EOSINOPHIL # BLD AUTO: 0.42 10*3/MM3 (ref 0–0.4)
EOSINOPHIL NFR BLD AUTO: 4.5 % (ref 0.3–6.2)
ERYTHROCYTE [DISTWIDTH] IN BLOOD BY AUTOMATED COUNT: 11.7 % (ref 12.3–15.4)
GFR SERPL CREATININE-BSD FRML MDRD: 84 ML/MIN/1.73
GLOBULIN UR ELPH-MCNC: 3.1 GM/DL
GLUCOSE SERPL-MCNC: 141 MG/DL (ref 65–99)
HBA1C MFR BLD: 8.66 % (ref 4.8–5.6)
HCT VFR BLD AUTO: 45.6 % (ref 34–46.6)
HDLC SERPL-MCNC: 42 MG/DL (ref 40–60)
HGB BLD-MCNC: 15.5 G/DL (ref 12–15.9)
IMM GRANULOCYTES # BLD AUTO: 0.03 10*3/MM3 (ref 0–0.05)
IMM GRANULOCYTES NFR BLD AUTO: 0.3 % (ref 0–0.5)
LDLC SERPL CALC-MCNC: 137 MG/DL (ref 0–100)
LDLC/HDLC SERPL: 3.23 {RATIO}
LYMPHOCYTES # BLD AUTO: 2.35 10*3/MM3 (ref 0.7–3.1)
LYMPHOCYTES NFR BLD AUTO: 25.1 % (ref 19.6–45.3)
MCH RBC QN AUTO: 30.2 PG (ref 26.6–33)
MCHC RBC AUTO-ENTMCNC: 34 G/DL (ref 31.5–35.7)
MCV RBC AUTO: 88.9 FL (ref 79–97)
MICROALBUMIN/CREAT UR: NORMAL MG/G{CREAT}
MONOCYTES # BLD AUTO: 0.51 10*3/MM3 (ref 0.1–0.9)
MONOCYTES NFR BLD AUTO: 5.4 % (ref 5–12)
NEUTROPHILS NFR BLD AUTO: 5.96 10*3/MM3 (ref 1.7–7)
NEUTROPHILS NFR BLD AUTO: 63.7 % (ref 42.7–76)
NRBC BLD AUTO-RTO: 0 /100 WBC (ref 0–0.2)
PLATELET # BLD AUTO: 330 10*3/MM3 (ref 140–450)
PMV BLD AUTO: 11.2 FL (ref 6–12)
POTASSIUM SERPL-SCNC: 4.4 MMOL/L (ref 3.5–5.2)
PROT SERPL-MCNC: 7.8 G/DL (ref 6–8.5)
PROT UR-MCNC: 6 MG/DL
PROT/CREAT UR: 65.5 MG/G CREA (ref 0–200)
RBC # BLD AUTO: 5.13 10*6/MM3 (ref 3.77–5.28)
SODIUM SERPL-SCNC: 139 MMOL/L (ref 136–145)
TRIGL SERPL-MCNC: 91 MG/DL (ref 0–150)
TSH SERPL DL<=0.05 MIU/L-ACNC: 0.68 UIU/ML (ref 0.27–4.2)
VIT B12 BLD-MCNC: 1022 PG/ML (ref 211–946)
VLDLC SERPL-MCNC: 17 MG/DL (ref 5–40)
WBC # BLD AUTO: 9.36 10*3/MM3 (ref 3.4–10.8)

## 2021-03-15 ENCOUNTER — TELEPHONE (OUTPATIENT)
Dept: ENDOCRINOLOGY | Facility: CLINIC | Age: 31
End: 2021-03-15

## 2021-03-15 RX ORDER — INSULIN GLARGINE 100 [IU]/ML
30 INJECTION, SOLUTION SUBCUTANEOUS DAILY
Qty: 4 PEN | Refills: 11 | Status: SHIPPED | OUTPATIENT
Start: 2021-03-15 | End: 2022-05-04

## 2021-03-15 NOTE — TELEPHONE ENCOUNTER
Pt left a vm stating that she is needing her Basalgar script changed from 20 units, to 24 units, says that she is running out before time to refill the script.     [facility] - [address] - [phone]  - [phone] FX

## 2021-03-15 NOTE — TELEPHONE ENCOUNTER
I called in new RX and it states 30 units she is to give 24 units but this gives her room to adjust if needed

## 2021-05-17 ENCOUNTER — HOSPITAL ENCOUNTER (OUTPATIENT)
Dept: PAIN MANAGEMENT | Age: 31
Discharge: HOME | End: 2021-05-17
Payer: MEDICAID

## 2021-05-17 VITALS
DIASTOLIC BLOOD PRESSURE: 67 MMHG | WEIGHT: 141 LBS | TEMPERATURE: 98 F | BODY MASS INDEX: 22.13 KG/M2 | HEIGHT: 67 IN | OXYGEN SATURATION: 95 % | HEART RATE: 80 BPM | RESPIRATION RATE: 16 BRPM | SYSTOLIC BLOOD PRESSURE: 98 MMHG

## 2021-05-17 DIAGNOSIS — M79.18 BILATERAL MYOFASCIAL PAIN: Primary | ICD-10-CM

## 2021-05-17 PROCEDURE — 99214 OFFICE O/P EST MOD 30 MIN: CPT | Performed by: NURSE PRACTITIONER

## 2021-05-17 PROCEDURE — 99213 OFFICE O/P EST LOW 20 MIN: CPT

## 2021-05-17 RX ORDER — CYCLOBENZAPRINE HCL 10 MG
10 TABLET ORAL 3 TIMES DAILY PRN
Qty: 90 TABLET | Refills: 2 | Status: SHIPPED | OUTPATIENT
Start: 2021-05-17 | End: 2021-08-15

## 2021-05-17 ASSESSMENT — PAIN DESCRIPTION - ORIENTATION: ORIENTATION: UPPER

## 2021-05-17 ASSESSMENT — ENCOUNTER SYMPTOMS
CONSTIPATION: 0
BACK PAIN: 0

## 2021-05-17 ASSESSMENT — PAIN DESCRIPTION - LOCATION: LOCATION: NECK

## 2021-05-17 NOTE — PROGRESS NOTES
Clinic Documentation      Education Provided:  [x] Review of Josefa Sawyer  [] Agreement Review  [x] PEG Score Calculated [] PHQ Score Calculated [] ORT Score Calculated    [] Compliance Issues Discussed [] Cognitive Behavior Needs [x] Exercise [x] Review of Test [] Financial Issues  [x] Tobacco/Alcohol Use Reviewed [x] Teaching [] New Patient [] Picture Obtained    Physician Plan:  [] Outgoing Referral  [] Pharmacy Consult  [] Test Ordered [] Prescription Ordered/Changed   [] Obtained Test Results / Consult Notes        Complete if patient is withholding blood thinner for procedure     Blood Thinner Patient is currently taking:      [] Plavix (Hold for 7 days)  [] Aspirin (Hold for 5 days)     [] Pletal (Hold for 2 days)  [] Pradaxa (Hold for 3 days)    [] Effient (Hold for 7 days)  [] Xarelto (Hold for 2 days)    [] Eliquis (Hold for 2 days)  [] Brilinta (Hold for 7 days)    [] Coumadin (Hold for 5 days) - (INR needs to be drawn the day prior to procedure- INR < 2.0)    [] Aggrenox (Hold for 7 days)        [] Patient will stop medication on their own.    [] Blood Thinner Form Faxed for approval to hold.    Provider form faxed to:     Assessment Completed by:  Electronically signed by Merline Renner RN on 5/17/2021 at 2:44 PM
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
## 2021-05-21 ENCOUNTER — DOCUMENTATION (OUTPATIENT)
Dept: ENDOCRINOLOGY | Facility: CLINIC | Age: 31
End: 2021-05-21

## 2021-05-21 ENCOUNTER — TELEPHONE (OUTPATIENT)
Dept: ENDOCRINOLOGY | Facility: CLINIC | Age: 31
End: 2021-05-21

## 2021-05-21 NOTE — TELEPHONE ENCOUNTER
Pt is needing to know if we have any samples of Basaglar? She has a PA waiting on approval and is needing this filled because she is almost completely out.

## 2021-05-28 ENCOUNTER — TELEPHONE (OUTPATIENT)
Dept: ENDOCRINOLOGY | Facility: CLINIC | Age: 31
End: 2021-05-28

## 2021-05-28 RX ORDER — INSULIN ASPART 100 [IU]/ML
INJECTION, SOLUTION INTRAVENOUS; SUBCUTANEOUS
Qty: 5 PEN | Refills: 11 | Status: SHIPPED | OUTPATIENT
Start: 2021-05-28 | End: 2021-06-03 | Stop reason: SDUPTHER

## 2021-06-03 RX ORDER — INSULIN ASPART 100 [IU]/ML
INJECTION, SOLUTION INTRAVENOUS; SUBCUTANEOUS
Qty: 5 PEN | Refills: 11 | Status: SHIPPED | OUTPATIENT
Start: 2021-06-03 | End: 2022-05-04 | Stop reason: SDUPTHER

## 2021-06-06 ASSESSMENT — ENCOUNTER SYMPTOMS
PHOTOPHOBIA: 1
NAUSEA: 1

## 2021-06-07 ENCOUNTER — OFFICE VISIT (OUTPATIENT)
Dept: ENDOCRINOLOGY | Facility: CLINIC | Age: 31
End: 2021-06-07

## 2021-06-07 VITALS
WEIGHT: 140.3 LBS | SYSTOLIC BLOOD PRESSURE: 116 MMHG | HEART RATE: 73 BPM | DIASTOLIC BLOOD PRESSURE: 64 MMHG | OXYGEN SATURATION: 97 % | HEIGHT: 67 IN | BODY MASS INDEX: 22.02 KG/M2

## 2021-06-07 DIAGNOSIS — F17.200 SMOKING: ICD-10-CM

## 2021-06-07 DIAGNOSIS — E55.9 VITAMIN D DEFICIENCY: ICD-10-CM

## 2021-06-07 DIAGNOSIS — E10.65 TYPE 1 DIABETES MELLITUS WITH HYPERGLYCEMIA (HCC): Primary | ICD-10-CM

## 2021-06-07 PROCEDURE — 95251 CONT GLUC MNTR ANALYSIS I&R: CPT | Performed by: NURSE PRACTITIONER

## 2021-06-07 PROCEDURE — 99214 OFFICE O/P EST MOD 30 MIN: CPT | Performed by: NURSE PRACTITIONER

## 2021-06-07 NOTE — PROGRESS NOTES
"Chief Complaint  Diabetes    Subjective          Ella Olivera presents to [facility] ENDOCRINOLOGY  History of Present Illness     In office visit       31-year-old female presents for follow-up    Reason diabetes mellitus type 1    Timing constant    Duration diagnosed in 2017    Severity is moderate    Quality not controlled    Macrovascular complications none    Microvascular complications peripheral neuropathy            Context-- presented to ER for fatigue, difficulty waking up and blood sugar was over 700          Diabetes Regimen--insulin by injections                Quantity     Lab Results   Component Value Date    HGBA1C 8.66 (H) 03/03/2021                Blood Glucose Readings     Checks sugars 6 times daily and has been for the last 90 days before dexcom      Has the dexcom now and able to monitor more frequently and having less hypoglycemia      Off pump         Dexcom G6        Downloaded and reviewed    Dated from May 25 to June 7, 2021    Average glucose 209    Time in target range 32.4%    High 67.1%    Very high 26.9%    Low 0.4%    Very low 0.2%    Review of Systems - General ROS: negative           Objective   Vital Signs:   /64   Pulse 73   Ht 168.9 cm (66.5\")   Wt 63.6 kg (140 lb 4.8 oz)   SpO2 97%   BMI 22.31 kg/m²     Physical Exam  Constitutional:       Appearance: Normal appearance.   Cardiovascular:      Rate and Rhythm: Regular rhythm.      Heart sounds: Normal heart sounds.   Pulmonary:      Breath sounds: Normal breath sounds.   Musculoskeletal:         General: Normal range of motion.      Cervical back: Normal range of motion.   Skin:     Coloration: Skin is not pale.   Neurological:      General: No focal deficit present.      Mental Status: She is alert.   Psychiatric:         Mood and Affect: Mood normal.         Thought Content: Thought content normal.         Judgment: Judgment normal.        Result Review :   The following data was reviewed " by: LILLIE Esquivel on 06/07/2021:  Common labs    Common Labsle 9/3/20 9/3/20 9/3/20 9/3/20 9/3/20 12/3/20 12/3/20 12/3/20 3/3/21 3/3/21 3/3/21 3/3/21 3/3/21    1141 1141 1141 1141 1141 1207 1207 1207 1422 1422 1422 1422 1422   Glucose   308 (A)     111 (A)   141 (A)     BUN   12     14   12     Creatinine   0.79     0.81   0.79     eGFR Non  Am   84     82   84     Sodium   136     139   139     Potassium   4.0     4.4   4.4     Chloride   101     100   102     Calcium   9.3     10.1   9.6     Albumin   4.60     5.40 (A)   4.70     Total Bilirubin   0.2     0.3   0.3     Alkaline Phosphatase   87     80   73     AST (SGOT)   15     17   22     ALT (SGPT)   10     11   13     WBC 11.35 (A)     13.53 (A)   9.36       Hemoglobin 14.9     15.1   15.5       Hematocrit 44.7     44.4   45.6       Platelets 284     328   330       Total Cholesterol    201 (A)        196    Triglycerides    101        91    HDL Cholesterol    38 (A)        42    LDL Cholesterol     143 (A)        137 (A)    Hemoglobin A1C  10.00 (A)     8.60 (A)   8.66 (A)      Microalbumin, Urine     <1.2        <1.2   (A) Abnormal value                      Assessment and Plan    Diagnoses and all orders for this visit:    1. Type 1 diabetes mellitus with hyperglycemia (CMS/[facility]) (Primary)  -     CBC & Differential  -     Comprehensive Metabolic Panel  -     Hemoglobin A1c  -     TSH    2. Vitamin D deficiency  -     CBC & Differential  -     Comprehensive Metabolic Panel  -     Hemoglobin A1c  -     TSH    3. Smoking    Other orders  -     Injection Device for Insulin (InPen 100-Pink-Denise) device; 1 each 3 (Three) Times a Day.  Dispense: 1 each; Refill: 1           Glycemic management    Diabetes mellitus type 1          Dexcom G6        Downloaded and reviewed    Dated from May 25 to June 7, 2021    Average glucose 209    Time in target range 32.4%    High 67.1%    Very high 26.9%    Low 0.4%    Very low 0.2%        She is running  high after meals    She is eating before bed causing her to rise overnight         make sure she boluses before each meal    Try to cut down on the nighttime eating    Also make sure she is covering her suppertime with insulin    And will adjust the mealtime insulin     Component      Latest Ref Rng & Units 5/17/2019   Islet Cell Ab      Neg:<1:1 Negative   BRENDA-65      0.0 - 5.0 U/mL 2607.1 (H)   C-Peptide      1.1 - 4.4 ng/mL 0.6 (L)                          Basaglar taking  24 units            Novolog ----   ---      1 unit per 10 grams of CHO     For supper do 1 per 8        plus sliding scale 1 per 50 above 150          Approved for in pen               off pump       Tandem insulin pump      Basal       0.70-            Carb ratio     10     Correction      40               Lipid Management        Patient is not on a statin            Total Cholesterol   Date Value Ref Range Status   03/03/2021 196 0 - 200 mg/dL Final     Triglycerides   Date Value Ref Range Status   03/03/2021 91 0 - 150 mg/dL Final     HDL Cholesterol   Date Value Ref Range Status   03/03/2021 42 40 - 60 mg/dL Final     LDL Cholesterol    Date Value Ref Range Status   03/03/2021 137 (H) 0 - 100 mg/dL Final        Blood Pressure Management     bp controlled not on ACEi               Microvascular Complication Monitoring        May 2018         microalbuminuria      Ratio - 33 High          neuropathy         Taking Gabapentin 400 mg tid daily                Bone Health        Component      Latest Ref Rng & Units 9/3/2020   25 Hydroxy, Vitamin D      30.0 - 100.0 ng/ml 32.2       vitamin d def.        Taking vitamin d supplement            Other Diabetes Related Aspects        Lab Results   Component Value Date    TTTXVTOH40 1,022 (H) 03/03/2021             Thyroid Health        Lab Results   Component Value Date    TSH 0.679 03/03/2021                Weight Management:        Patient's Body mass index is 22.31 kg/m². indicating that she is  within normal range (BMI 18.5-24.9). No BMI management plan needed..                Preventive Care:     Patient is smoking         Ella Chery Olivera  reports that she has been smoking cigarettes. She has never used smokeless tobacco.. I have educated her on the risk of diseases from using tobacco products such as cancer, COPD and heart disease.     I advised her to quit and she is not willing to quit.    I spent 3  minutes counseling the patient.                      Follow Up   Return in about 3 months (around 9/7/2021) for Recheck.  Patient was given instructions and counseling regarding her condition or for health maintenance advice. Please see specific information pulled into the AVS if appropriate.

## 2021-06-09 ENCOUNTER — HOSPITAL ENCOUNTER (OUTPATIENT)
Dept: PAIN MANAGEMENT | Age: 31
Discharge: HOME | End: 2021-06-09
Payer: MEDICAID

## 2021-06-09 VITALS
RESPIRATION RATE: 18 BRPM | HEART RATE: 68 BPM | SYSTOLIC BLOOD PRESSURE: 103 MMHG | OXYGEN SATURATION: 97 % | TEMPERATURE: 96.2 F | DIASTOLIC BLOOD PRESSURE: 65 MMHG

## 2021-06-09 PROCEDURE — 64615 CHEMODENERV MUSC MIGRAINE: CPT

## 2021-06-09 PROCEDURE — 6360000002 HC RX W HCPCS

## 2021-06-09 PROCEDURE — 64615 CHEMODENERV MUSC MIGRAINE: CPT | Performed by: NURSE PRACTITIONER

## 2021-06-09 PROCEDURE — 2580000003 HC RX 258

## 2021-06-09 RX ORDER — EMOLLIENT BASE
CREAM (GRAM) TOPICAL
Qty: 200 G | Refills: 5
Start: 2021-06-09 | End: 2021-12-28 | Stop reason: SDUPTHER

## 2021-06-09 RX ORDER — SODIUM CHLORIDE 9 MG/ML
4.5 INJECTION INTRAVENOUS ONCE
Status: DISCONTINUED | OUTPATIENT
Start: 2021-06-09 | End: 2021-06-11 | Stop reason: HOSPADM

## 2021-06-09 NOTE — PROGRESS NOTES
Procedure:  Level of Consciousness: [x]Alert [x]Oriented []Disoriented []Lethargic  Anxiety Level: [x]Calm []Anxious []Depressed []Other  Skin: [x]Warm [x]Dry []Cool []Moist []Intact []Other  Cardiovascular: [x]Palpitations: [x]Never []Occasionally []Frequently  Chest Pain: [x]No []Yes  Respiratory:  [x]Unlabored []Labored []Cough ([] Productive []Unproductive)  HCG Required: [x]No []Yes   Results: []Negative []Positive  Knowledge Level:        [x]Patient/Other verbalized understanding of pre-procedure instructions. [x]Assessment of post-op care needs (transportation, responsible caregiver)        [x]Able to discuss health care problems and how to deal with it. Factors that Affect Teaching:        Language Barrier: [x]No []Yes - why:        Hearing Loss:        [x]No []Yes            Corrective Device:  []Yes []No        Vision Loss:           [x]No []Yes            Corrective Device:  []Yes []No        Memory Loss:       [x]No []Yes            []Short Term []Long Term  Motivational Level:  [x]Asks Questions                  []Extremely Anxious       [x]Seems Interested               []Seems Uninterested                  [x]Denies need for Education  Risk for Injury:  [x]Patient oriented to person, place and time  []History of frequent falls/loss of balance  Nutritional:  []Change in appetite   []Weight Gain   []Weight Loss  Functional:  []Requires assistance with ADL's      Botox Assessment  [] Patient  has had a reduction of at least 100 hours (5 Days) in Migraines since receiving Botox  [x]this is first Botox treatment[]  []  Factors that Affect Teaching:  Assessment of post-op care needs (transportation, responsible caregiver)        []Able to discuss health care problems and how to deal with it.   Factors that Affect Teaching:

## 2021-06-17 ENCOUNTER — TELEPHONE (OUTPATIENT)
Dept: ENDOCRINOLOGY | Facility: CLINIC | Age: 31
End: 2021-06-17

## 2021-06-17 ENCOUNTER — TELEPHONE (OUTPATIENT)
Dept: PAIN MANAGEMENT | Age: 31
End: 2021-06-17

## 2021-06-17 NOTE — TELEPHONE ENCOUNTER
[facility] - [place], [place] - [facility] COUNTRY [facility] - [phone] [facility] [phone] FX    Left a vm about the script that was transferred to them, the InPen, it is unavailable to them and they need a call back.

## 2021-07-08 ENCOUNTER — HOSPITAL ENCOUNTER (OUTPATIENT)
Dept: PAIN MANAGEMENT | Age: 31
Discharge: HOME | End: 2021-07-08
Payer: MEDICAID

## 2021-07-08 VITALS
OXYGEN SATURATION: 96 % | HEIGHT: 66 IN | HEART RATE: 82 BPM | BODY MASS INDEX: 21.86 KG/M2 | WEIGHT: 136 LBS | TEMPERATURE: 96 F

## 2021-07-08 PROCEDURE — 99213 OFFICE O/P EST LOW 20 MIN: CPT | Performed by: NURSE PRACTITIONER

## 2021-07-08 PROCEDURE — 99213 OFFICE O/P EST LOW 20 MIN: CPT

## 2021-07-08 RX ORDER — SUMATRIPTAN 50 MG/1
50 TABLET, FILM COATED ORAL
Qty: 9 TABLET | Refills: 3 | Status: SHIPPED | OUTPATIENT
Start: 2021-07-08 | End: 2021-07-08

## 2021-07-08 RX ORDER — ONDANSETRON 4 MG/1
4 TABLET, ORALLY DISINTEGRATING ORAL 3 TIMES DAILY PRN
Qty: 21 TABLET | Refills: 0 | Status: SHIPPED | OUTPATIENT
Start: 2021-07-08

## 2021-07-08 ASSESSMENT — ENCOUNTER SYMPTOMS
NAUSEA: 1
BACK PAIN: 0
PHOTOPHOBIA: 1
CONSTIPATION: 0

## 2021-07-08 ASSESSMENT — PAIN DESCRIPTION - PAIN TYPE: TYPE: CHRONIC PAIN

## 2021-07-08 ASSESSMENT — PAIN DESCRIPTION - LOCATION: LOCATION: HEAD

## 2021-07-08 ASSESSMENT — PAIN SCALES - GENERAL: PAINLEVEL_OUTOF10: 6
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
## 2021-07-08 NOTE — PROGRESS NOTES
Clinic Documentation      Education Provided:  [x] Review of José Miguel Bass  [] Agreement Review  [x] PEG Score Calculated [] PHQ Score Calculated [] ORT Score Calculated    [] Compliance Issues Discussed [] Cognitive Behavior Needs [x] Exercise [] Review of Test [] Financial Issues  [x] Tobacco/Alcohol Use Reviewed [x] Teaching [] New Patient [] Picture Obtained    Physician Plan:  [] Outgoing Referral  [] Pharmacy Consult  [] Test Ordered [x] Prescription Ordered/Changed   [] Obtained Test Results / Consult Notes        Complete if patient is withholding blood thinner for procedure     Blood Thinner Patient is currently taking:      [] Plavix (Hold for 7 days)  [] Aspirin (Hold for 5 days)     [] Pletal (Hold for 2 days)  [] Pradaxa (Hold for 3 days)    [] Effient (Hold for 7 days)  [] Xarelto (Hold for 2 days)    [] Eliquis (Hold for 2 days)  [] Brilinta (Hold for 7 days)    [] Coumadin (Hold for 5 days) - (INR needs to be drawn the day prior to procedure- INR < 2.0)    [] Aggrenox (Hold for 7 days)        [] Patient will stop medication on their own.    [] Blood Thinner Form Faxed for approval to hold.    Provider form faxed to:   Assessment Completed by:  Electronically signed by Evaristo Romero on 7/8/2021 at 10:39 AM

## 2021-07-17 NOTE — TELEPHONE ENCOUNTER
Pt says her insurance is no longer covering Admelog, she is needing Novolog sent in. She only has 26 units of insulin left.      [facility] - [place], [place] - [facility] COUNTRY [facility] - [phone]  - [phone] FX   cooperative

## 2021-07-21 ENCOUNTER — TELEPHONE (OUTPATIENT)
Dept: ENDOCRINOLOGY | Facility: CLINIC | Age: 31
End: 2021-07-21

## 2021-07-21 NOTE — TELEPHONE ENCOUNTER
Pt left a vm stating that she has been having complications with her sugar for about three weeks, she went the the ER, they told her to back off her long lasting and now she can't stay out of the 300's. She says she is really needing some advice.

## 2021-07-21 NOTE — TELEPHONE ENCOUNTER
Spoke with pt - instructed to increase on Monday and Thursday by 2un at a time. Goal is to wake up less than 150 - titrate accordingly. Pt verbalized understanding.

## 2021-07-30 ENCOUNTER — DOCUMENTATION (OUTPATIENT)
Dept: ENDOCRINOLOGY | Facility: CLINIC | Age: 31
End: 2021-07-30

## 2021-08-06 ENCOUNTER — TELEPHONE (OUTPATIENT)
Dept: ENDOCRINOLOGY | Facility: CLINIC | Age: 31
End: 2021-08-06

## 2021-08-06 NOTE — TELEPHONE ENCOUNTER
Pt left a vm stating that she is a type 1 DM and was just diagnosed with covid, she is wanting a call back to discuss this.

## 2021-09-07 ENCOUNTER — OFFICE VISIT (OUTPATIENT)
Dept: ENDOCRINOLOGY | Facility: CLINIC | Age: 31
End: 2021-09-07

## 2021-09-07 ENCOUNTER — DOCUMENTATION (OUTPATIENT)
Dept: ENDOCRINOLOGY | Facility: CLINIC | Age: 31
End: 2021-09-07

## 2021-09-07 VITALS
WEIGHT: 136.5 LBS | DIASTOLIC BLOOD PRESSURE: 64 MMHG | SYSTOLIC BLOOD PRESSURE: 116 MMHG | BODY MASS INDEX: 21.94 KG/M2 | OXYGEN SATURATION: 95 % | HEIGHT: 66 IN | HEART RATE: 74 BPM

## 2021-09-07 DIAGNOSIS — E10.65 TYPE 1 DIABETES MELLITUS WITH HYPERGLYCEMIA (HCC): Primary | ICD-10-CM

## 2021-09-07 DIAGNOSIS — F17.200 SMOKING: ICD-10-CM

## 2021-09-07 DIAGNOSIS — E55.9 VITAMIN D DEFICIENCY: ICD-10-CM

## 2021-09-07 PROCEDURE — 99214 OFFICE O/P EST MOD 30 MIN: CPT | Performed by: NURSE PRACTITIONER

## 2021-09-07 PROCEDURE — 95251 CONT GLUC MNTR ANALYSIS I&R: CPT | Performed by: NURSE PRACTITIONER

## 2021-09-07 NOTE — PROGRESS NOTES
"Chief Complaint  Diabetes    Subjective          Ella Chery Olivera presents to [facility] ENDOCRINOLOGY  History of Present Illness     In office visit     31 year old female presents for follow up     Reason diabetes mellitus type 1     Duration diagnosed in 2017     Timing constant     Quality not controlled     Microvascular complications peripheral neuropathy     Macrovascular complications none     Context-- presented to ER for fatigue, difficulty waking up and blood sugar was over 700          Diabetes Regimen--insulin by injections      Off pump                 Quantity     Lab Results   Component Value Date    HGBA1C 8.66 (H) 03/03/2021                Blood Glucose Readings     Checks sugars 6 times daily and has been for the last 90 days before dexcom      Has the dexcom now and able to monitor more frequently and having less hypoglycemia      Off pump        Dexcom G6                Review of Systems - General ROS: negative        Objective   Vital Signs:   /64   Pulse 74   Ht 167.6 cm (66\")   Wt 61.9 kg (136 lb 8 oz)   SpO2 95%   BMI 22.03 kg/m²     Physical Exam  Constitutional:       Appearance: Normal appearance.   Cardiovascular:      Rate and Rhythm: Regular rhythm.      Heart sounds: Normal heart sounds.   Pulmonary:      Breath sounds: Normal breath sounds.   Musculoskeletal:      Cervical back: Normal range of motion.   Neurological:      Mental Status: She is alert.        Result Review :   The following data was reviewed by: LILLIE Esquivel on 09/07/2021:  Common labs    Common Labsle 12/3/20 12/3/20 12/3/20 3/3/21 3/3/21 3/3/21 3/3/21 3/3/21    1207 1207 1207 1422 1422 1422 1422 1422   Glucose   111 (A)   141 (A)     BUN   14   12     Creatinine   0.81   0.79     eGFR Non  Am   82   84     Sodium   139   139     Potassium   4.4   4.4     Chloride   100   102     Calcium   10.1   9.6     Albumin   5.40 (A)   4.70     Total Bilirubin   " 0.3   0.3     Alkaline Phosphatase   80   73     AST (SGOT)   17   22     ALT (SGPT)   11   13     WBC 13.53 (A)   9.36       Hemoglobin 15.1   15.5       Hematocrit 44.4   45.6       Platelets 328   330       Total Cholesterol       196    Triglycerides       91    HDL Cholesterol       42    LDL Cholesterol        137 (A)    Hemoglobin A1C  8.60 (A)   8.66 (A)      Microalbumin, Urine        <1.2   (A) Abnormal value                      Assessment and Plan    Diagnoses and all orders for this visit:    1. Type 1 diabetes mellitus with hyperglycemia (CMS/[facility]) (Primary)  -     CBC & Differential  -     Comprehensive Metabolic Panel  -     Hemoglobin A1c  -     TSH  -     Microalbumin / Creatinine Urine Ratio - Urine, Clean Catch  -     Protein / Creatinine Ratio, Urine - Urine, Clean Catch    2. Vitamin D deficiency    3. Smoking           Glycemic management     Diabetes mellitus type 1        Lab Results   Component Value Date    HGBA1C 8.66 (H) 03/03/2021             Dexcom G6                     Hyperglycemia starts at 2 am       Hypoglycemia occurring at work       Adjust overnight and work day insulin            Component      Latest Ref Rng & Units 5/17/2019   Islet Cell Ab      Neg:<1:1 Negative   BRENDA-65      0.0 - 5.0 U/mL 2607.1 (H)   C-Peptide      1.1 - 4.4 ng/mL 0.6 (L)                taking Basaglar 24 units daily     On work days will take 20 units               Novolog ----   ---      1 unit per 10 grams of CHO on off day s    Work days do 1 unit per 15 grams of CHO             plus sliding scale 1 per 50 above 150              approve omni pod          Approve for  Insulin pump and or Continuous Glucose Sensor      #1  Patient has diabetes mellitus, insulin-dependent.     #2 She performs blood glucose testing at least times daily and blood glucose log was brought to office with variability from .     #3  She is requiring  Basal insulin  and Prandial Insulin for a total of at least  4  injections per day and has been doing this for at least 6 months      #4 Patient tests blood sugars at least 4 times daily and makes frequent self-adjustments and patient is injecting insulin at least 4 times daily. She has been doing this for more than 6 months . She tests frequently due to hypoglycemia and hyperglycemia.      #5 I have personally seen patient within the past 6 months     #6 We plan on seeing her every 2-3 months for continuous adjustment of her diabetes regimen      #7 patient has hypoglycemia with episodes of unawareness.     #8 patient has day-to-day variation in her mealtime which confounds the degree of insulin dosing with multiple daily injections.     #9 patient has completed diabetes education program with us.     #10 she has demonstrated the ability to self monitor her glucose.         #11 Patient is motivated in improving  diabetes control              off pump       Tandem insulin pump      Basal       0.70-            Carb ratio     10     Correction      40               Lipid Management        Patient is not on a statin            Total Cholesterol   Date Value Ref Range Status   03/03/2021 196 0 - 200 mg/dL Final     Triglycerides   Date Value Ref Range Status   03/03/2021 91 0 - 150 mg/dL Final     HDL Cholesterol   Date Value Ref Range Status   03/03/2021 42 40 - 60 mg/dL Final     LDL Cholesterol    Date Value Ref Range Status   03/03/2021 137 (H) 0 - 100 mg/dL Final        Blood Pressure Management     bp controlled not on ACEi               Microvascular Complication Monitoring        May 2018         microalbuminuria      Ratio - 33 High          neuropathy           Taking Gabapentin 400 mg tid daily                Bone Health        Component      Latest Ref Rng & Units 9/3/2020   25 Hydroxy, Vitamin D      30.0 - 100.0 ng/ml 32.2       vitamin d def.        Taking vitamin d supplement            Other Diabetes Related Aspects              Lab Results   Component Value Date      [id number] 1,022 (H) 03/03/2021               Thyroid Health              Lab Results   Component Value Date     TSH 0.679 03/03/2021                  Weight Management:        Patient's Body mass index is 22.03 kg/m². indicating that she is within normal range (BMI 18.5-24.9). No BMI management plan needed..             Preventive Care:     Patient is smoking         Ella Chery Olivera  reports that she has been smoking cigarettes. She has never used smokeless tobacco.. I have educated her on the risk of diseases from using tobacco products such as cancer, COPD and heart disease.     I advised her to quit and she is not willing to quit.    I spent 3  minutes counseling the patient.                         Follow Up   Return in about 3 months (around 12/7/2021).  Patient was given instructions and counseling regarding her condition or for health maintenance advice. Please see specific information pulled into the AVS if appropriate.         This document has been electronically signed by LILLIE Esquivel on September 7, 2021 10:58 CDT.

## 2021-09-14 ENCOUNTER — DOCUMENTATION (OUTPATIENT)
Dept: ENDOCRINOLOGY | Facility: CLINIC | Age: 31
End: 2021-09-14

## 2021-09-22 ENCOUNTER — HOSPITAL ENCOUNTER (OUTPATIENT)
Dept: PAIN MANAGEMENT | Age: 31
Discharge: HOME | End: 2021-09-22
Payer: MEDICAID

## 2021-09-22 VITALS
OXYGEN SATURATION: 100 % | DIASTOLIC BLOOD PRESSURE: 63 MMHG | RESPIRATION RATE: 18 BRPM | TEMPERATURE: 96.5 F | HEART RATE: 54 BPM | SYSTOLIC BLOOD PRESSURE: 96 MMHG

## 2021-09-22 PROCEDURE — 6360000002 HC RX W HCPCS

## 2021-09-22 PROCEDURE — 64615 CHEMODENERV MUSC MIGRAINE: CPT | Performed by: NURSE PRACTITIONER

## 2021-09-22 PROCEDURE — 2580000003 HC RX 258

## 2021-09-22 PROCEDURE — 64615 CHEMODENERV MUSC MIGRAINE: CPT

## 2021-09-22 RX ORDER — SODIUM CHLORIDE 9 MG/ML
4.5 INJECTION INTRAVENOUS ONCE
Status: DISCONTINUED | OUTPATIENT
Start: 2021-09-22 | End: 2021-09-24 | Stop reason: HOSPADM

## 2021-09-22 NOTE — PROCEDURES
[address]    Patient Name: Miya Carranza    : 1990                    Age: 33 y.o. Sex: female    Date: 2021    Pre-op Diagnosis: Chronic Migraines    Post-op Diagnosis: Chronic Migraines    Procedure: Botox injections x 155 units (45 units wasted)    Performing Procedure: ZBIGNIEW Johnson    Patient Vitals for the past 24 hrs:   BP Temp Temp src Pulse Resp SpO2   21 0929 96/63 96.5 °F (35.8 °C) Temporal 54 18 100 %       Previous Injections:  Patient had 30/6 headaches/migraines over the past month. Indications:    Miya Carranza has been treated for problems with chronic migraine headaches. The patient was taken through a conservative course of treatment without complete resolution of symptoms. Botox injection therapy was offered and after the risks and benefits of the procedure were explained to the patient, we had agreed to proceed. The patient was taken to the procedure room and placed in the seated position. The following muscles were identified using palpation and standard landmarks. These muscles were marked and prepped with alcohol. A total of 155 units were injected after careful aspiration and the following distribution bilaterally:  10 units in 2 sites, procerus 5 units in one site, frontalis 20 units in 4 sites, temporalis 40 unit in 8 sites, occipitals 30 units in 6 sites, cervical paraspinals 20 units in 4 sites, and trapezius 30 units in 6 sites. Discharge: The patient tolerated the procedure well. There were no complications during the procedure and the patient was discharged home with discharge instructions. The patient has been instructed to contact the office should there be any complications or questions to arise between today and their next appointment. Plan:    Patient to follow up in the office in about 6 weeks.      ZBIGNIEW Danielle - CNP, 2021 at 2:23 PM

## 2021-09-22 NOTE — PROGRESS NOTES
Patient states that he/she has _30_____ headaches out of 30 days each month.   Patient states that he/she has __6____ migraines out of 30 days each month.monthly

## 2021-09-28 ENCOUNTER — DOCUMENTATION (OUTPATIENT)
Dept: ENDOCRINOLOGY | Facility: CLINIC | Age: 31
End: 2021-09-28

## 2021-09-28 ENCOUNTER — TELEPHONE (OUTPATIENT)
Dept: PAIN MANAGEMENT | Age: 31
End: 2021-09-28

## 2021-10-08 ENCOUNTER — TRANSCRIBE ORDERS (OUTPATIENT)
Dept: PODIATRY | Facility: CLINIC | Age: 31
End: 2021-10-08

## 2021-10-08 DIAGNOSIS — L60.0 INGROWN TOENAIL: Primary | ICD-10-CM

## 2021-10-11 ENCOUNTER — LAB (OUTPATIENT)
Dept: LAB | Facility: HOSPITAL | Age: 31
End: 2021-10-11

## 2021-10-11 ENCOUNTER — OFFICE VISIT (OUTPATIENT)
Dept: PODIATRY | Facility: CLINIC | Age: 31
End: 2021-10-11

## 2021-10-11 VITALS — OXYGEN SATURATION: 96 % | HEART RATE: 71 BPM | HEIGHT: 66 IN | BODY MASS INDEX: 21.86 KG/M2 | WEIGHT: 136 LBS

## 2021-10-11 DIAGNOSIS — M79.674 PAIN IN TOES OF BOTH FEET: ICD-10-CM

## 2021-10-11 DIAGNOSIS — M79.675 PAIN IN TOES OF BOTH FEET: ICD-10-CM

## 2021-10-11 DIAGNOSIS — E11.42 DIABETIC POLYNEUROPATHY ASSOCIATED WITH TYPE 2 DIABETES MELLITUS (HCC): ICD-10-CM

## 2021-10-11 DIAGNOSIS — L60.0 INGROWN TOENAIL: Primary | ICD-10-CM

## 2021-10-11 PROCEDURE — 84156 ASSAY OF PROTEIN URINE: CPT | Performed by: NURSE PRACTITIONER

## 2021-10-11 PROCEDURE — 83036 HEMOGLOBIN GLYCOSYLATED A1C: CPT | Performed by: NURSE PRACTITIONER

## 2021-10-11 PROCEDURE — 82570 ASSAY OF URINE CREATININE: CPT | Performed by: NURSE PRACTITIONER

## 2021-10-11 PROCEDURE — 99203 OFFICE O/P NEW LOW 30 MIN: CPT | Performed by: PODIATRIST

## 2021-10-11 PROCEDURE — 82043 UR ALBUMIN QUANTITATIVE: CPT | Performed by: NURSE PRACTITIONER

## 2021-10-11 PROCEDURE — 36415 COLL VENOUS BLD VENIPUNCTURE: CPT | Performed by: NURSE PRACTITIONER

## 2021-10-11 PROCEDURE — 80050 GENERAL HEALTH PANEL: CPT | Performed by: NURSE PRACTITIONER

## 2021-10-11 PROCEDURE — 11750 EXCISION NAIL&NAIL MATRIX: CPT | Performed by: PODIATRIST

## 2021-10-11 RX ORDER — FLUTICASONE PROPIONATE 50 MCG
SPRAY, SUSPENSION (ML) NASAL
COMMUNITY
End: 2022-05-04

## 2021-10-11 RX ORDER — SUMATRIPTAN 50 MG/1
1 TABLET, FILM COATED ORAL ONCE AS NEEDED
COMMUNITY
Start: 2021-07-08 | End: 2022-05-04

## 2021-10-11 RX ORDER — INSULIN PUMP CONTROLLER
EACH MISCELLANEOUS
COMMUNITY
Start: 2021-09-27 | End: 2022-08-12

## 2021-10-11 RX ORDER — CALCIPOTRIENE 50 UG/G
CREAM TOPICAL
COMMUNITY
Start: 2021-09-20

## 2021-10-11 RX ORDER — EMOLLIENT BASE
CREAM (GRAM) TOPICAL
COMMUNITY
Start: 2021-06-09

## 2021-10-11 RX ORDER — FLUCONAZOLE 150 MG/1
TABLET ORAL
COMMUNITY
End: 2022-05-04

## 2021-10-11 RX ORDER — FLURBIPROFEN SODIUM 0.3 MG/ML
SOLUTION/ DROPS OPHTHALMIC
COMMUNITY
Start: 2021-09-20

## 2021-10-11 RX ORDER — AMITRIPTYLINE HYDROCHLORIDE 25 MG/1
TABLET, FILM COATED ORAL
COMMUNITY
End: 2021-10-11

## 2021-10-11 RX ORDER — BUSPIRONE HYDROCHLORIDE 7.5 MG/1
TABLET ORAL EVERY 8 HOURS SCHEDULED
COMMUNITY
End: 2022-05-04 | Stop reason: DRUGHIGH

## 2021-10-11 RX ORDER — ALBUTEROL SULFATE 90 UG/1
AEROSOL, METERED RESPIRATORY (INHALATION) EVERY 4 HOURS
COMMUNITY
End: 2021-10-11

## 2021-10-11 RX ORDER — DULOXETIN HYDROCHLORIDE 30 MG/1
CAPSULE, DELAYED RELEASE ORAL
COMMUNITY
End: 2021-10-11

## 2021-10-11 NOTE — PROGRESS NOTES
Ella Olivera  1990  31 y.o. female   Fina Laura 8/17/2021  BS: 228 PER PATIENT     Patient came to clinic for concern of bilateral hallux ingrown     10/11/2021  Chief Complaint   Patient presents with   • Left Foot - Ingrown Toenail   • Right Foot - Ingrown Toenail           History of Present Illness    Ella Olivera is a 31 y.o. female who presents for evaluation of chronic, intermittent bilateral ingrowing nails.  States she usually trims these out on her own monthly.  She denies any previous formal treatments.  Does note intermittent sharp tenderness with direct pressure.  Patient is type I diabetic and does have some degree of neuropathy.      Past Medical History:   Diagnosis Date   • Allergic rhinitis    • Backache    • Cyst of ovary    • Diabetes (HCC)    • Endometriosis     clinical   • Endometriosis of pelvic peritoneum     h/o   • Genital warts     h/o   • Ingrown toenail    • Irregular periods    • Left lower quadrant pain    • Pelvic and perineal pain     has bladder tenderness, uterine tenderness, and left adnexal tenderness   • Urethritis    • Urinary tract infectious disease          Past Surgical History:   Procedure Laterality Date   • APPENDECTOMY     • CHOLECYSTECTOMY     • DENTAL PROCEDURE      wisdom teeth extraction   • DIAGNOSTIC LAPAROSCOPY  12/27/2007    exam under anesthesia, diagnostic laparoscopy. lower abdominal pain of uncertain etiology. stage II endometriosis.   • GALLBLADDER SURGERY     • TONSILLECTOMY  04/09/2002    chronic tonsillitis         Family History   Problem Relation Age of Onset   • Diabetes Other    • Hypertension Other    • No Known Problems Mother    • No Known Problems Father    • No Known Problems Sister    • No Known Problems Brother    • Cancer Maternal Grandmother    • Diabetes Paternal Grandfather          Social History     Socioeconomic History   • Marital status:    Tobacco Use   • Smoking status: Current Every Day Smoker      Types: Cigarettes   • Smokeless tobacco: Never Used   Vaping Use   • Vaping Use: Never used   Substance and Sexual Activity   • Alcohol use: No   • Drug use: Defer     Comment: past use   • Sexual activity: Defer     Partners: Male     Birth control/protection: None         Current Outpatient Medications   Medication Sig Dispense Refill   • B-D UF III MINI PEN NEEDLES 31G X 5 MM misc USE WITH INSULIN UP TO 6 TIMES AS DIRECTED     • Injection Device for Insulin (InPen 100-Pink-Denise) device 1 each 3 (Three) Times a Day. 1 each 1   • Insulin Glargine (LANTUS SOLOSTAR) 100 UNIT/ML injection pen Inject 24 Units under the skin into the appropriate area as directed Daily. 3 pen 11   • Insulin Pen Needle (Pure Comfort Pen Needle) 32G X 4 MM misc Inject 4 times daily, E10.65 120 each 11   • busPIRone (BUSPAR) 15 MG tablet Take 15 mg by mouth As Needed.     • busPIRone (BUSPAR) 7.5 MG tablet Every 8 (Eight) Hours.     • calcipotriene (DOVONEX) 0.005 % cream      • cetirizine (zyrTEC) 10 MG tablet Take 1 tablet by mouth Daily.     • Cream Base cream [place] Pain Cream #4 Add Gabapentin 6% Apply 1-2 pumps to affected area 3-4 times a day     • fluconazole (Diflucan) 150 MG tablet Diflucan 150 mg tablet   Take 1 tablet every day by oral route for 2 days.     • fluticasone (FLONASE) 50 MCG/ACT nasal spray fluticasone propionate 50 mcg/actuation nasal spray,suspension   Spray 1 spray every day by intranasal route for 30 days.     • gabapentin (NEURONTIN) 400 MG capsule As Needed.     • glucagon (Glucagon Emergency) 1 MG injection Inject 1 mg under the skin into the appropriate area as directed 1 (One) Time As Needed for Low Blood Sugar for up to 1 dose. 1 kit 6   • insulin aspart (NovoLOG FlexPen) 100 UNIT/ML solution pen-injector sc pen Inject 4 up to 20 units TID with meals based on carb count 5 pen 11   • Insulin Disposable Pump (OmniPod Dash 5 Pack Pods) misc USE 1 POD EVERY 72 HOURS     • Insulin Glargine (BASAGLAR  "KWIKPEN) 100 UNIT/ML injection pen Inject 30 Units under the skin into the appropriate area as directed Daily. 4 pen 11   • insulin lispro (humaLOG) 100 UNIT/ML injection Use up to 90 units through pump daily 30 mL 11   • methocarbamol (ROBAXIN) 750 MG tablet Take 1 tablet by mouth 3 (Three) Times a Day.     • Prenatal Multivit-Min-Fe-FA (PRENATAL 1 + IRON PO) Take  by mouth Daily.     • propranolol (INDERAL) 20 MG tablet Take 1 tablet by mouth Every Night.     • SUMAtriptan (IMITREX) 50 MG tablet Take 1 tablet by mouth 1 (One) Time As Needed.     • VENTOLIN  (90 Base) MCG/ACT inhaler Inhale 2 puffs Every 4 (Four) Hours As Needed for Wheezing. 9 g 11     No current facility-administered medications for this visit.         OBJECTIVE    Pulse 71   Ht 167.6 cm (66\")   Wt 61.7 kg (136 lb)   SpO2 96%   BMI 21.95 kg/m²       Review of Systems   Constitutional: Positive for activity change.   Eyes: Negative.    Respiratory: Negative.    Cardiovascular: Negative.    Gastrointestinal: Positive for constipation.   Endocrine: Negative.    Genitourinary: Negative.    Musculoskeletal: Positive for back pain.        Foot pain and ankle pain    Skin: Negative.    Allergic/Immunologic: Negative.    Neurological: Positive for headaches.   Hematological: Negative.    Psychiatric/Behavioral: Negative.          Physical Exam   Constitutional: she appears well-developed and well-nourished.   CV: No chest pain. Normal RR  Resp: Non labored respirations  Psychiatric: she has a normal mood and affect. her behavior is normal.      Lower Extremity Exam:  Vascular: DP/PT pulses palpable 2+.   No hallux edema  Toes warm  Neuro: Protective sensation intact, b/l.  DTRs intact  Integument: No open wounds.  Ingrown medial nail border, bilateral hallux. Mild erythema, edema. +tenderness to palpation.  Web spaces c/d/i  No skin lesions  Musculoskeletal: LE muscle strength 5/5.   Gait normal  Ankle ROM full without pain or crepitus  STJ " ROM full without pain or crepitus  No digital deformities      Nail Removal    Date/Time: 10/11/2021 11:45 AM  Performed by: Larry Kaplan DPM  Authorized by: Larry Kaplan DPM   Consent: Written consent obtained.  Risks and benefits: risks, benefits and alternatives were discussed  Consent given by: patient  Location: right foot  Location details: right big toe  Anesthesia: digital block    Anesthesia:  Local Anesthetic: lidocaine 2% without epinephrine  Anesthetic total: 3 mL  Preparation: skin prepped with Betadine  Amount removed: partial  Side: medial  Nail matrix removed: partial  Dressing: 4x4 and antibiotic ointment  Patient tolerance: patient tolerated the procedure well with no immediate complications  Comments: Matrixectomy with 10% NaOH. Neutralized with acetic acid.      Nail Removal    Date/Time: 10/11/2021 11:45 AM  Performed by: Larry Kaplan DPM  Authorized by: Larry Kaplan DPM   Consent: Written consent obtained.  Risks and benefits: risks, benefits and alternatives were discussed  Consent given by: patient  Location: left foot  Location details: left big toe  Anesthesia: digital block    Anesthesia:  Local Anesthetic: lidocaine 2% without epinephrine  Anesthetic total: 3 mL  Preparation: skin prepped with Betadine  Amount removed: partial  Side: medial  Nail matrix removed: partial  Dressing: 4x4 and antibiotic ointment  Patient tolerance: patient tolerated the procedure well with no immediate complications  Comments: Matrixectomy with 10% NaOH. Neutralized with acetic acid.                  ASSESSMENT AND PLAN    Diagnoses and all orders for this visit:    1. Ingrown toenail (Primary)    2. Pain in toes of both feet    3. Diabetic polyneuropathy associated with type 2 diabetes mellitus (HCC)      -Comprehensive foot and ankle exam performed  -Diagnosis, prevention, and treatment of ingrown toe nails discussed with patient, including risks and potential benefits of nail  avulsion both temporary and permanent versus simple debridement.  -Pt elected for partial permanent avulsion of bilateral hallux  -Aftercare instructions for soapy nguyen soaks BID  -Recheck 2 weeks          This document has been electronically signed by Larry Kaplan DPM on October 11, 2021 11:45 CDT       Much of this encounter note is an electronic transcription/translation of spoken language to printed text.   Larry Kaplan DPM  10/11/2021  11:45 CDT

## 2021-10-12 LAB
ALBUMIN SERPL-MCNC: 4.8 G/DL (ref 3.5–5.2)
ALBUMIN UR-MCNC: <1.2 MG/DL
ALBUMIN/GLOB SERPL: 2 G/DL
ALP SERPL-CCNC: 71 U/L (ref 39–117)
ALT SERPL W P-5'-P-CCNC: 18 U/L (ref 1–33)
ANION GAP SERPL CALCULATED.3IONS-SCNC: 10.5 MMOL/L (ref 5–15)
AST SERPL-CCNC: 19 U/L (ref 1–32)
BASOPHILS # BLD AUTO: 0.1 10*3/MM3 (ref 0–0.2)
BASOPHILS NFR BLD AUTO: 1 % (ref 0–1.5)
BILIRUB SERPL-MCNC: 0.4 MG/DL (ref 0–1.2)
BUN SERPL-MCNC: 14 MG/DL (ref 6–20)
BUN/CREAT SERPL: 19.2 (ref 7–25)
CALCIUM SPEC-SCNC: 9.3 MG/DL (ref 8.6–10.5)
CHLORIDE SERPL-SCNC: 105 MMOL/L (ref 98–107)
CO2 SERPL-SCNC: 24.5 MMOL/L (ref 22–29)
CREAT SERPL-MCNC: 0.73 MG/DL (ref 0.57–1)
CREAT UR-MCNC: 52 MG/DL
CREAT UR-MCNC: 53.1 MG/DL
DEPRECATED RDW RBC AUTO: 40.1 FL (ref 37–54)
EOSINOPHIL # BLD AUTO: 0.35 10*3/MM3 (ref 0–0.4)
EOSINOPHIL NFR BLD AUTO: 3.6 % (ref 0.3–6.2)
ERYTHROCYTE [DISTWIDTH] IN BLOOD BY AUTOMATED COUNT: 12 % (ref 12.3–15.4)
GFR SERPL CREATININE-BSD FRML MDRD: 92 ML/MIN/1.73
GLOBULIN UR ELPH-MCNC: 2.4 GM/DL
GLUCOSE SERPL-MCNC: 172 MG/DL (ref 65–99)
HBA1C MFR BLD: 8 % (ref 4.8–5.6)
HCT VFR BLD AUTO: 40.1 % (ref 34–46.6)
HGB BLD-MCNC: 13.2 G/DL (ref 12–15.9)
IMM GRANULOCYTES # BLD AUTO: 0.03 10*3/MM3 (ref 0–0.05)
IMM GRANULOCYTES NFR BLD AUTO: 0.3 % (ref 0–0.5)
LYMPHOCYTES # BLD AUTO: 2.23 10*3/MM3 (ref 0.7–3.1)
LYMPHOCYTES NFR BLD AUTO: 22.8 % (ref 19.6–45.3)
MCH RBC QN AUTO: 30 PG (ref 26.6–33)
MCHC RBC AUTO-ENTMCNC: 32.9 G/DL (ref 31.5–35.7)
MCV RBC AUTO: 91.1 FL (ref 79–97)
MICROALBUMIN/CREAT UR: NORMAL MG/G{CREAT}
MONOCYTES # BLD AUTO: 0.52 10*3/MM3 (ref 0.1–0.9)
MONOCYTES NFR BLD AUTO: 5.3 % (ref 5–12)
NEUTROPHILS NFR BLD AUTO: 6.53 10*3/MM3 (ref 1.7–7)
NEUTROPHILS NFR BLD AUTO: 67 % (ref 42.7–76)
NRBC BLD AUTO-RTO: 0 /100 WBC (ref 0–0.2)
PLATELET # BLD AUTO: 281 10*3/MM3 (ref 140–450)
PMV BLD AUTO: 11 FL (ref 6–12)
POTASSIUM SERPL-SCNC: 4 MMOL/L (ref 3.5–5.2)
PROT SERPL-MCNC: 7.2 G/DL (ref 6–8.5)
PROT UR-MCNC: 6 MG/DL
PROT/CREAT UR: 113 MG/G CREA (ref 0–200)
RBC # BLD AUTO: 4.4 10*6/MM3 (ref 3.77–5.28)
SODIUM SERPL-SCNC: 140 MMOL/L (ref 136–145)
TSH SERPL DL<=0.05 MIU/L-ACNC: 0.46 UIU/ML (ref 0.27–4.2)
WBC # BLD AUTO: 9.76 10*3/MM3 (ref 3.4–10.8)

## 2021-11-04 ENCOUNTER — TELEPHONE (OUTPATIENT)
Dept: ENDOCRINOLOGY | Facility: CLINIC | Age: 31
End: 2021-11-04

## 2021-11-04 ENCOUNTER — DOCUMENTATION (OUTPATIENT)
Dept: ENDOCRINOLOGY | Facility: CLINIC | Age: 31
End: 2021-11-04

## 2021-11-04 NOTE — PROGRESS NOTES
SPOKE WITH PT ABOUT HER HIGH SUGARS.   ADVISED SHE INCREASE HER BASAL TO 1.0 FROM 2200 TO 0800.   HER HIGH SUGARS ARE OCCURRING OVER NIGHT, I ADVISED HER TO CONTACT OMNIPOD WITH HELP CHANGING SETTINGS AND TO LET US KNOW IF THE BASAL CHANGE HELPED.

## 2021-11-04 NOTE — TELEPHONE ENCOUNTER
Pt says son tested positive for Covid her test came back negative but is having symptoms so sent hers off but sugars   Having really high highs in 300's then will plummet and shoot back up needs a call to see what to do . Has a pump so will take insulin needed when high then plummet [phone]

## 2021-11-18 ENCOUNTER — HOSPITAL ENCOUNTER (OUTPATIENT)
Dept: PAIN MANAGEMENT | Age: 31
Discharge: HOME | End: 2021-11-18
Payer: MEDICAID

## 2021-11-18 VITALS
BODY MASS INDEX: 22.82 KG/M2 | HEART RATE: 67 BPM | DIASTOLIC BLOOD PRESSURE: 68 MMHG | TEMPERATURE: 97 F | OXYGEN SATURATION: 97 % | HEIGHT: 66 IN | SYSTOLIC BLOOD PRESSURE: 99 MMHG | WEIGHT: 142 LBS

## 2021-11-18 DIAGNOSIS — G43.011 INTRACTABLE MIGRAINE WITHOUT AURA AND WITH STATUS MIGRAINOSUS: ICD-10-CM

## 2021-11-18 PROBLEM — G43.009 MIGRAINE WITHOUT AURA AND WITHOUT STATUS MIGRAINOSUS, NOT INTRACTABLE: Status: ACTIVE | Noted: 2021-11-18

## 2021-11-18 PROCEDURE — 99214 OFFICE O/P EST MOD 30 MIN: CPT | Performed by: NURSE PRACTITIONER

## 2021-11-18 PROCEDURE — 99213 OFFICE O/P EST LOW 20 MIN: CPT

## 2021-11-18 RX ORDER — UBROGEPANT 100 MG/1
1 TABLET ORAL
Qty: 30 TABLET | Refills: 5 | Status: SHIPPED | OUTPATIENT
Start: 2021-11-18 | End: 2021-11-18

## 2021-11-18 RX ORDER — ATOGEPANT 60 MG/1
60 TABLET ORAL EVERY MORNING
Qty: 30 TABLET | Refills: 2 | Status: SHIPPED | OUTPATIENT
Start: 2021-11-18

## 2021-11-18 ASSESSMENT — ENCOUNTER SYMPTOMS
NAUSEA: 1
BACK PAIN: 0
CONSTIPATION: 0
PHOTOPHOBIA: 1

## 2021-11-18 ASSESSMENT — PAIN SCALES - GENERAL: PAINLEVEL_OUTOF10: 6

## 2021-11-18 ASSESSMENT — PAIN DESCRIPTION - LOCATION: LOCATION: HEAD

## 2021-11-18 ASSESSMENT — PAIN DESCRIPTION - PAIN TYPE: TYPE: CHRONIC PAIN

## 2021-11-18 NOTE — PROGRESS NOTES
Clinic Documentation      Education Provided:  [x] Review of Jorge Keenan  [] Agreement Review  [x] PEG Score Calculated [] PHQ Score Calculated [] ORT Score Calculated    [] Compliance Issues Discussed [] Cognitive Behavior Needs [x] Exercise [] Review of Test [] Financial Issues  [x] Tobacco/Alcohol Use Reviewed [x] Teaching [] New Patient [] Picture Obtained    Physician Plan:  [] Outgoing Referral  [] Pharmacy Consult  [] Test Ordered [x] Prescription Ordered/Changed   [] Obtained Test Results / Consult Notes        Complete if patient is withholding blood thinner for procedure     Blood Thinner Patient is currently taking:      [] Plavix (Hold for 7 days)  [] Aspirin (Hold for 5 days)     [] Pletal (Hold for 2 days)  [] Pradaxa (Hold for 3 days)    [] Effient (Hold for 7 days)  [] Xarelto (Hold for 2 days)    [] Eliquis (Hold for 2 days)  [] Brilinta (Hold for 7 days)    [] Coumadin (Hold for 5 days) - (INR needs to be drawn the day prior to procedure- INR < 2.0)    [] Aggrenox (Hold for 7 days)        [] Patient will stop medication on their own.    [] Blood Thinner Form Faxed for approval to hold.    Provider form faxed to:     Assessment Completed by:  Electronically signed by Blue Kaur MA on 11/18/2021 at 9:46 AM
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
## 2021-11-23 ENCOUNTER — TELEPHONE (OUTPATIENT)
Dept: PAIN MANAGEMENT | Age: 31
End: 2021-11-23

## 2021-11-24 ENCOUNTER — TELEPHONE (OUTPATIENT)
Dept: PAIN MANAGEMENT | Age: 31
End: 2021-11-24

## 2021-12-08 ENCOUNTER — LAB (OUTPATIENT)
Dept: LAB | Facility: HOSPITAL | Age: 31
End: 2021-12-08

## 2021-12-08 ENCOUNTER — OFFICE VISIT (OUTPATIENT)
Dept: ENDOCRINOLOGY | Facility: CLINIC | Age: 31
End: 2021-12-08

## 2021-12-08 VITALS
OXYGEN SATURATION: 100 % | HEIGHT: 66 IN | WEIGHT: 138.1 LBS | HEART RATE: 101 BPM | BODY MASS INDEX: 22.19 KG/M2 | DIASTOLIC BLOOD PRESSURE: 70 MMHG | SYSTOLIC BLOOD PRESSURE: 120 MMHG

## 2021-12-08 DIAGNOSIS — R30.0 DYSURIA: ICD-10-CM

## 2021-12-08 DIAGNOSIS — E55.9 VITAMIN D DEFICIENCY: ICD-10-CM

## 2021-12-08 DIAGNOSIS — E10.65 TYPE 1 DIABETES MELLITUS WITH HYPERGLYCEMIA (HCC): Primary | ICD-10-CM

## 2021-12-08 LAB
ALBUMIN SERPL-MCNC: 4.5 G/DL (ref 3.5–5.2)
ALBUMIN/GLOB SERPL: 1.6 G/DL
ALP SERPL-CCNC: 78 U/L (ref 39–117)
ALT SERPL W P-5'-P-CCNC: 10 U/L (ref 1–33)
ANION GAP SERPL CALCULATED.3IONS-SCNC: 8 MMOL/L (ref 5–15)
AST SERPL-CCNC: 17 U/L (ref 1–32)
BASOPHILS # BLD AUTO: 0.1 10*3/MM3 (ref 0–0.2)
BASOPHILS NFR BLD AUTO: 1 % (ref 0–1.5)
BILIRUB SERPL-MCNC: 0.2 MG/DL (ref 0–1.2)
BILIRUB UR QL STRIP: NEGATIVE
BUN SERPL-MCNC: 13 MG/DL (ref 6–20)
BUN/CREAT SERPL: 17.8 (ref 7–25)
CALCIUM SPEC-SCNC: 9.4 MG/DL (ref 8.6–10.5)
CHLORIDE SERPL-SCNC: 102 MMOL/L (ref 98–107)
CLARITY UR: CLEAR
CO2 SERPL-SCNC: 29 MMOL/L (ref 22–29)
COLOR UR: YELLOW
CREAT SERPL-MCNC: 0.73 MG/DL (ref 0.57–1)
DEPRECATED RDW RBC AUTO: 41.3 FL (ref 37–54)
EOSINOPHIL # BLD AUTO: 0.34 10*3/MM3 (ref 0–0.4)
EOSINOPHIL NFR BLD AUTO: 3.3 % (ref 0.3–6.2)
ERYTHROCYTE [DISTWIDTH] IN BLOOD BY AUTOMATED COUNT: 12.6 % (ref 12.3–15.4)
GFR SERPL CREATININE-BSD FRML MDRD: 92 ML/MIN/1.73
GLOBULIN UR ELPH-MCNC: 2.9 GM/DL
GLUCOSE SERPL-MCNC: 140 MG/DL (ref 65–99)
GLUCOSE UR STRIP-MCNC: NEGATIVE MG/DL
HCT VFR BLD AUTO: 41.9 % (ref 34–46.6)
HGB BLD-MCNC: 14 G/DL (ref 12–15.9)
HGB UR QL STRIP.AUTO: NEGATIVE
IMM GRANULOCYTES # BLD AUTO: 0.05 10*3/MM3 (ref 0–0.05)
IMM GRANULOCYTES NFR BLD AUTO: 0.5 % (ref 0–0.5)
KETONES UR QL STRIP: NEGATIVE
LEUKOCYTE ESTERASE UR QL STRIP.AUTO: NEGATIVE
LYMPHOCYTES # BLD AUTO: 1.88 10*3/MM3 (ref 0.7–3.1)
LYMPHOCYTES NFR BLD AUTO: 18.3 % (ref 19.6–45.3)
MCH RBC QN AUTO: 30 PG (ref 26.6–33)
MCHC RBC AUTO-ENTMCNC: 33.4 G/DL (ref 31.5–35.7)
MCV RBC AUTO: 89.7 FL (ref 79–97)
MONOCYTES # BLD AUTO: 0.54 10*3/MM3 (ref 0.1–0.9)
MONOCYTES NFR BLD AUTO: 5.3 % (ref 5–12)
NEUTROPHILS NFR BLD AUTO: 7.36 10*3/MM3 (ref 1.7–7)
NEUTROPHILS NFR BLD AUTO: 71.6 % (ref 42.7–76)
NITRITE UR QL STRIP: NEGATIVE
NRBC BLD AUTO-RTO: 0 /100 WBC (ref 0–0.2)
PH UR STRIP.AUTO: 6.5 [PH] (ref 5–8)
PLATELET # BLD AUTO: 301 10*3/MM3 (ref 140–450)
PMV BLD AUTO: 10.6 FL (ref 6–12)
POTASSIUM SERPL-SCNC: 4.1 MMOL/L (ref 3.5–5.2)
PROT SERPL-MCNC: 7.4 G/DL (ref 6–8.5)
PROT UR QL STRIP: NEGATIVE
RBC # BLD AUTO: 4.67 10*6/MM3 (ref 3.77–5.28)
SODIUM SERPL-SCNC: 139 MMOL/L (ref 136–145)
SP GR UR STRIP: 1.02 (ref 1–1.03)
TSH SERPL DL<=0.05 MIU/L-ACNC: 0.71 UIU/ML (ref 0.27–4.2)
UROBILINOGEN UR QL STRIP: NORMAL
WBC NRBC COR # BLD: 10.27 10*3/MM3 (ref 3.4–10.8)

## 2021-12-08 PROCEDURE — 36415 COLL VENOUS BLD VENIPUNCTURE: CPT | Performed by: NURSE PRACTITIONER

## 2021-12-08 PROCEDURE — 95251 CONT GLUC MNTR ANALYSIS I&R: CPT | Performed by: NURSE PRACTITIONER

## 2021-12-08 PROCEDURE — 81003 URINALYSIS AUTO W/O SCOPE: CPT | Performed by: NURSE PRACTITIONER

## 2021-12-08 PROCEDURE — 99214 OFFICE O/P EST MOD 30 MIN: CPT | Performed by: NURSE PRACTITIONER

## 2021-12-08 PROCEDURE — 80050 GENERAL HEALTH PANEL: CPT | Performed by: NURSE PRACTITIONER

## 2021-12-08 RX ORDER — FLUCONAZOLE 150 MG/1
150 TABLET ORAL ONCE
Qty: 1 TABLET | Refills: 0 | Status: SHIPPED | OUTPATIENT
Start: 2021-12-08 | End: 2021-12-08

## 2021-12-08 NOTE — PROGRESS NOTES
"Chief Complaint  Diabetes    Subjective          Ella Chery Olivera presents to [facility] ENDOCRINOLOGY  History of Present Illness       In office visit    31-year-old female presents for follow-up    Reason diabetes mellitus type 1    Timing constant    Quality not controlled    Diagnosed in 2017    Lab Results   Component Value Date    HGBA1C 8.00 (H) 10/11/2021          Microvascular complications peripheral neuropathy      Macrovascular complications none      Context-- presented to ER for fatigue, difficulty waking up and blood sugar was over 700          Diabetes Regimen--      Now on Omni pod        Blood Glucose Readings     Checks sugars 6 times daily and has been for the last 90 days before dexcom      Has the dexcom now and able to monitor more frequently and having less hypoglycemia      Off pump         Dexcom G6                   Objective   Vital Signs:   /70   Pulse 101   Ht 167.6 cm (66\")   Wt 62.6 kg (138 lb 1.6 oz)   SpO2 100%   BMI 22.29 kg/m²     Physical Exam  Constitutional:       Appearance: Normal appearance.   Cardiovascular:      Rate and Rhythm: Regular rhythm.      Heart sounds: Normal heart sounds.   Pulmonary:      Breath sounds: Normal breath sounds.   Musculoskeletal:      Cervical back: Normal range of motion.   Neurological:      Mental Status: She is alert.        Result Review :   The following data was reviewed by: LILLIE Esquivel on 12/08/2021:  Common labs    Common Labsle 3/3/21 3/3/21 3/3/21 3/3/21 3/3/21 10/11/21 10/11/21 10/11/21 10/11/21    1422 1422 1422 1422 1422 1103 1103 1103 1436   Glucose   141 (A)     172 (A)    BUN   12     14    Creatinine   0.79     0.73    eGFR Non  Am   84     92    Sodium   139     140    Potassium   4.4     4.0    Chloride   102     105    Calcium   9.6     9.3    Albumin   4.70     4.80    Total Bilirubin   0.3     0.4    Alkaline Phosphatase   73     71    AST (SGOT)   22     " 19    ALT (SGPT)   13     18    WBC 9.36     9.76      Hemoglobin 15.5     13.2      Hematocrit 45.6     40.1      Platelets 330     281      Total Cholesterol    196        Triglycerides    91        HDL Cholesterol    42        LDL Cholesterol     137 (A)        Hemoglobin A1C  8.66 (A)     8.00 (A)     Microalbumin, Urine     <1.2    <1.2   (A) Abnormal value                      Assessment and Plan    Diagnoses and all orders for this visit:    1. Type 1 diabetes mellitus with hyperglycemia (HCC) (Primary)  -     CBC & Differential  -     Comprehensive Metabolic Panel  -     Cancel: Hemoglobin A1c  -     TSH  -     Urinalysis With Culture If Indicated -    2. Vitamin D deficiency  -     CBC & Differential  -     Comprehensive Metabolic Panel  -     Cancel: Hemoglobin A1c  -     TSH  -     Urinalysis With Culture If Indicated -    3. Dysuria  -     CBC & Differential  -     Comprehensive Metabolic Panel  -     Cancel: Hemoglobin A1c  -     TSH  -     Urinalysis With Culture If Indicated -    Other orders  -     fluconazole (Diflucan) 150 MG tablet; Take 1 tablet by mouth 1 (One) Time for 1 dose.  Dispense: 1 tablet; Refill: 0           Glycemic management     Diabetes mellitus type 1        Lab Results   Component Value Date    HGBA1C 8.00 (H) 10/11/2021          Dexcom G6                                    she is currently having a UTI and yeast       States this is causing high and does not want adjustments     We discussed running temp          Component      Latest Ref Rng & Units 5/17/2019   Islet Cell Ab      Neg:<1:1 Negative   BRENDA-65      0.0 - 5.0 U/mL 2607.1 (H)   C-Peptide      1.1 - 4.4 ng/mL 0.6 (L)             omni pod     Basal    MN       Carb ratio      Correction         Target bg           Previous regimen   Off  Basaglar 24 units daily      On work days will take 20 units               Novolog ----   ---      1 unit per 10 grams of CHO on off day s     Work days do 1 unit per 15 grams of CHO                                               Lipid Management        Patient is not on a statin                  Total Cholesterol   Date Value Ref Range Status   03/03/2021 196 0 - 200 mg/dL Final            Triglycerides   Date Value Ref Range Status   03/03/2021 91 0 - 150 mg/dL Final            HDL Cholesterol   Date Value Ref Range Status   03/03/2021 42 40 - 60 mg/dL Final            LDL Cholesterol    Date Value Ref Range Status   03/03/2021 137 (H) 0 - 100 mg/dL Final         Blood Pressure Management     bp controlled not on ACEi               Microvascular Complication Monitoring        May 2018         microalbuminuria      Ratio - 33 High          neuropathy           Taking Gabapentin 400 mg tid daily                Bone Health        Component      Latest Ref Rng & Units 9/3/2020   25 Hydroxy, Vitamin D      30.0 - 100.0 ng/ml 32.2       vitamin d def.        Taking vitamin d supplement            Other Diabetes Related Aspects        Lab Results   Component Value Date    [id number] 1,022 (H) 03/03/2021             Thyroid Health     Lab Results   Component Value Date    TSH 0.462 10/11/2021             Weight Management:                      Preventive Care:     Patient is smoking                              Follow Up   No follow-ups on file.  Patient was given instructions and counseling regarding her condition or for health maintenance advice. Please see specific information pulled into the AVS if appropriate.         This document has been electronically signed by LILLIE Esquivel on December 8, 2021 08:40 CST.

## 2021-12-29 RX ORDER — EMOLLIENT BASE
CREAM (GRAM) TOPICAL
Qty: 200 G | Refills: 5
Start: 2021-12-29

## 2022-01-05 ENCOUNTER — HOSPITAL ENCOUNTER (OUTPATIENT)
Dept: PAIN MANAGEMENT | Age: 32
Discharge: HOME | End: 2022-01-05
Payer: MEDICAID

## 2022-01-05 VITALS
OXYGEN SATURATION: 97 % | TEMPERATURE: 97.1 F | SYSTOLIC BLOOD PRESSURE: 104 MMHG | DIASTOLIC BLOOD PRESSURE: 71 MMHG | HEART RATE: 74 BPM | RESPIRATION RATE: 18 BRPM

## 2022-01-05 PROCEDURE — 64615 CHEMODENERV MUSC MIGRAINE: CPT | Performed by: NURSE PRACTITIONER

## 2022-01-05 PROCEDURE — 6360000002 HC RX W HCPCS

## 2022-01-05 PROCEDURE — 64615 CHEMODENERV MUSC MIGRAINE: CPT

## 2022-01-05 PROCEDURE — 2580000003 HC RX 258

## 2022-01-05 RX ORDER — SODIUM CHLORIDE 9 MG/ML
4.5 INJECTION INTRAVENOUS ONCE
Status: DISCONTINUED | OUTPATIENT
Start: 2022-01-05 | End: 2022-01-07 | Stop reason: HOSPADM

## 2022-01-05 NOTE — PROCEDURES
[address]    Patient Name: Juju Quintanilla    : 1990                    Age: 33 y.o. Sex: female    Date: 2022    Pre-op Diagnosis: Chronic Migraines    Post-op Diagnosis: Chronic Migraines    Procedure: Botox injections x 155 units (45 units wasted)    Performing Procedure: ZBIGNIEW Stringer    Patient Vitals for the past 24 hrs:   BP Temp Temp src Pulse Resp SpO2   22 1333 104/71 97.1 °F (36.2 °C) Temporal 74 18 97 %       Previous Injections:  Patient had 30/30 headaches/migraines over the past month. Indications:    Juju Quintanilla has been treated for problems with chronic migraine headaches. The patient was taken through a conservative course of treatment without complete resolution of symptoms. Botox injection therapy was offered and after the risks and benefits of the procedure were explained to the patient, we had agreed to proceed. The patient was taken to the procedure room and placed in the seated position. The following muscles were identified using palpation and standard landmarks. These muscles were marked and prepped with alcohol. A total of 155 units were injected after careful aspiration and the following distribution bilaterally:  10 units in 2 sites, procerus 5 units in one site, frontalis 20 units in 4 sites, temporalis 40 unit in 8 sites, occipitals 30 units in 6 sites, cervical paraspinals 20 units in 4 sites, and trapezius 30 units in 6 sites. Discharge: The patient tolerated the procedure well. There were no complications during the procedure and the patient was discharged home with discharge instructions. The patient has been instructed to contact the office should there be any complications or questions to arise between today and their next appointment. Plan:    Patient to follow up in the office in about 6 weeks.      ZBIGNIEW Rangel - CNP, 2022 at 2:57 PM

## 2022-01-12 ENCOUNTER — TELEPHONE (OUTPATIENT)
Dept: PAIN MANAGEMENT | Age: 32
End: 2022-01-12

## 2022-01-12 NOTE — TELEPHONE ENCOUNTER
Spoke with pt concerning her injections she had on 01/05. Pt states she is not any better. States she has had an ongoing migraine for 3 days now. She has had 4 botox injections and feels she has not gotten any relief from any of them. Pt is a 8/10 today. No further questions at this time.

## 2022-02-08 ENCOUNTER — TELEPHONE (OUTPATIENT)
Dept: ENDOCRINOLOGY | Facility: CLINIC | Age: 32
End: 2022-02-08

## 2022-02-08 NOTE — TELEPHONE ENCOUNTER
Pt called and said that she got a new Omnipod and was told to call to get her settings from the old one so they could be placed on the new one. Please call patient.    Thanks

## 2022-02-14 ENCOUNTER — TELEPHONE (OUTPATIENT)
Dept: ENDOCRINOLOGY | Facility: CLINIC | Age: 32
End: 2022-02-14

## 2022-02-14 NOTE — TELEPHONE ENCOUNTER
"Pump has been malfunctioning.   She states that it is the \"computer\" part of it that is not working due to needing particular numbers. She called the company that her pump came from and they told her the information she is needing to put into the pump is information that we will have on file here.     [phone]  "

## 2022-05-04 ENCOUNTER — OFFICE VISIT (OUTPATIENT)
Dept: ENDOCRINOLOGY | Facility: CLINIC | Age: 32
End: 2022-05-04

## 2022-05-04 ENCOUNTER — LAB (OUTPATIENT)
Dept: LAB | Facility: HOSPITAL | Age: 32
End: 2022-05-04

## 2022-05-04 VITALS
WEIGHT: 141 LBS | SYSTOLIC BLOOD PRESSURE: 104 MMHG | HEART RATE: 84 BPM | BODY MASS INDEX: 22.66 KG/M2 | OXYGEN SATURATION: 99 % | HEIGHT: 66 IN | DIASTOLIC BLOOD PRESSURE: 66 MMHG

## 2022-05-04 DIAGNOSIS — R30.0 DYSURIA: ICD-10-CM

## 2022-05-04 DIAGNOSIS — E10.65 TYPE 1 DIABETES MELLITUS WITH HYPERGLYCEMIA: Primary | ICD-10-CM

## 2022-05-04 DIAGNOSIS — E10.42 DIABETIC POLYNEUROPATHY ASSOCIATED WITH TYPE 1 DIABETES MELLITUS: ICD-10-CM

## 2022-05-04 DIAGNOSIS — F17.200 SMOKING: ICD-10-CM

## 2022-05-04 DIAGNOSIS — E55.9 VITAMIN D DEFICIENCY: ICD-10-CM

## 2022-05-04 LAB
ALBUMIN SERPL-MCNC: 4.3 G/DL (ref 3.5–5.2)
ALBUMIN/GLOB SERPL: 1.8 G/DL
ALP SERPL-CCNC: 64 U/L (ref 39–117)
ALT SERPL W P-5'-P-CCNC: 12 U/L (ref 1–33)
ANION GAP SERPL CALCULATED.3IONS-SCNC: 10 MMOL/L (ref 5–15)
AST SERPL-CCNC: 23 U/L (ref 1–32)
BASOPHILS # BLD AUTO: 0.12 10*3/MM3 (ref 0–0.2)
BASOPHILS NFR BLD AUTO: 1.2 % (ref 0–1.5)
BILIRUB SERPL-MCNC: 0.3 MG/DL (ref 0–1.2)
BUN SERPL-MCNC: 11 MG/DL (ref 6–20)
BUN/CREAT SERPL: 13.9 (ref 7–25)
CALCIUM SPEC-SCNC: 9.2 MG/DL (ref 8.6–10.5)
CHLORIDE SERPL-SCNC: 101 MMOL/L (ref 98–107)
CO2 SERPL-SCNC: 28 MMOL/L (ref 22–29)
CREAT SERPL-MCNC: 0.79 MG/DL (ref 0.57–1)
DEPRECATED RDW RBC AUTO: 42.7 FL (ref 37–54)
EGFRCR SERPLBLD CKD-EPI 2021: 100.8 ML/MIN/1.73
EOSINOPHIL # BLD AUTO: 0.17 10*3/MM3 (ref 0–0.4)
EOSINOPHIL NFR BLD AUTO: 1.7 % (ref 0.3–6.2)
ERYTHROCYTE [DISTWIDTH] IN BLOOD BY AUTOMATED COUNT: 13.1 % (ref 12.3–15.4)
GLOBULIN UR ELPH-MCNC: 2.4 GM/DL
GLUCOSE SERPL-MCNC: 271 MG/DL (ref 65–99)
HCT VFR BLD AUTO: 39 % (ref 34–46.6)
HGB BLD-MCNC: 13.4 G/DL (ref 12–15.9)
IMM GRANULOCYTES # BLD AUTO: 0.03 10*3/MM3 (ref 0–0.05)
IMM GRANULOCYTES NFR BLD AUTO: 0.3 % (ref 0–0.5)
LYMPHOCYTES # BLD AUTO: 1.84 10*3/MM3 (ref 0.7–3.1)
LYMPHOCYTES NFR BLD AUTO: 18.4 % (ref 19.6–45.3)
MCH RBC QN AUTO: 30.7 PG (ref 26.6–33)
MCHC RBC AUTO-ENTMCNC: 34.4 G/DL (ref 31.5–35.7)
MCV RBC AUTO: 89.2 FL (ref 79–97)
MONOCYTES # BLD AUTO: 0.58 10*3/MM3 (ref 0.1–0.9)
MONOCYTES NFR BLD AUTO: 5.8 % (ref 5–12)
NEUTROPHILS NFR BLD AUTO: 7.26 10*3/MM3 (ref 1.7–7)
NEUTROPHILS NFR BLD AUTO: 72.6 % (ref 42.7–76)
NRBC BLD AUTO-RTO: 0 /100 WBC (ref 0–0.2)
PLATELET # BLD AUTO: 283 10*3/MM3 (ref 140–450)
PMV BLD AUTO: 11.5 FL (ref 6–12)
POTASSIUM SERPL-SCNC: 4.3 MMOL/L (ref 3.5–5.2)
PROT SERPL-MCNC: 6.7 G/DL (ref 6–8.5)
RBC # BLD AUTO: 4.37 10*6/MM3 (ref 3.77–5.28)
SODIUM SERPL-SCNC: 139 MMOL/L (ref 136–145)
TSH SERPL DL<=0.05 MIU/L-ACNC: 0.56 UIU/ML (ref 0.27–4.2)
WBC NRBC COR # BLD: 10 10*3/MM3 (ref 3.4–10.8)

## 2022-05-04 PROCEDURE — 81003 URINALYSIS AUTO W/O SCOPE: CPT | Performed by: NURSE PRACTITIONER

## 2022-05-04 PROCEDURE — 83036 HEMOGLOBIN GLYCOSYLATED A1C: CPT | Performed by: NURSE PRACTITIONER

## 2022-05-04 PROCEDURE — 36415 COLL VENOUS BLD VENIPUNCTURE: CPT | Performed by: NURSE PRACTITIONER

## 2022-05-04 PROCEDURE — 99214 OFFICE O/P EST MOD 30 MIN: CPT | Performed by: NURSE PRACTITIONER

## 2022-05-04 PROCEDURE — 80050 GENERAL HEALTH PANEL: CPT | Performed by: NURSE PRACTITIONER

## 2022-05-04 RX ORDER — INSULIN ASPART 100 [IU]/ML
INJECTION, SOLUTION INTRAVENOUS; SUBCUTANEOUS
Qty: 10 PEN | Refills: 11 | Status: SHIPPED | OUTPATIENT
Start: 2022-05-04

## 2022-05-04 RX ORDER — NITROFURANTOIN 25; 75 MG/1; MG/1
100 CAPSULE ORAL 2 TIMES DAILY
Qty: 14 CAPSULE | Refills: 0 | Status: SHIPPED | OUTPATIENT
Start: 2022-05-04

## 2022-05-04 RX ORDER — GLUCAGON INJECTION, SOLUTION 1 MG/.2ML
1 INJECTION, SOLUTION SUBCUTANEOUS AS NEEDED
Qty: 0.4 ML | Refills: 11 | Status: SHIPPED | OUTPATIENT
Start: 2022-05-04 | End: 2022-05-05

## 2022-05-04 NOTE — PROGRESS NOTES
"Chief Complaint  Diabetes (Follow up )    Subjective          Ellazahida Olivera presents to [facility] ENDOCRINOLOGY  History of Present Illness     In office visit     31-year-old female presents for follow-up     Reason diabetes mellitus type 1    Diagnosed in 2017      Timing constant     Quality not controlled              Microvascular complications peripheral neuropathy          Context-- presented to ER for fatigue, difficulty waking up and blood sugar was over 700          Diabetes Regimen--        Now on Omni pod        Blood Glucose Readings     Checks sugars 6 times daily and has been for the last 90 days before dexcom      Has the dexcom now and able to monitor more frequently and having less hypoglycemia      Off pump         Dexcom G6--out of sensors                     Review of Systems - General ROS: negative          Objective   Vital Signs:   /66   Pulse 84   Ht 167.6 cm (66\")   Wt 64 kg (141 lb)   SpO2 99%   BMI 22.76 kg/m²     Physical Exam  Constitutional:       Appearance: Normal appearance.   Cardiovascular:      Rate and Rhythm: Regular rhythm.      Heart sounds: Normal heart sounds.   Pulmonary:      Breath sounds: Normal breath sounds.   Musculoskeletal:      Cervical back: Normal range of motion.   Neurological:      Mental Status: She is alert.        Result Review :   The following data was reviewed by: LILLIE Esquivel on 05/04/2022:  Common labs    Common Labsle 10/11/21 10/11/21 10/11/21 10/11/21 12/8/21 12/8/21    1103 1103 1103 1436 0859 0859   Glucose   172 (A)   140 (A)   BUN   14   13   Creatinine   0.73   0.73   eGFR Non African Am   92   92   Sodium   140   139   Potassium   4.0   4.1   Chloride   105   102   Calcium   9.3   9.4   Albumin   4.80   4.50   Total Bilirubin   0.4   0.2   Alkaline Phosphatase   71   78   AST (SGOT)   19   17   ALT (SGPT)   18   10   WBC 9.76    10.27    Hemoglobin 13.2    14.0    Hematocrit 40.1  "   41.9    Platelets 281    301    Hemoglobin A1C  8.00 (A)       Microalbumin, Urine    <1.2     (A) Abnormal value                      Assessment and Plan    Diagnoses and all orders for this visit:    1. Type 1 diabetes mellitus with hyperglycemia (HCC) (Primary)  -     CBC & Differential  -     Comprehensive Metabolic Panel  -     Hemoglobin A1c  -     TSH  -     Urinalysis With Culture If Indicated - Urine, Clean Catch    2. Dysuria  -     CBC & Differential  -     Comprehensive Metabolic Panel  -     Hemoglobin A1c  -     TSH  -     Urinalysis With Culture If Indicated - Urine, Clean Catch    3. Vitamin D deficiency  -     CBC & Differential  -     Comprehensive Metabolic Panel  -     Hemoglobin A1c  -     TSH  -     Urinalysis With Culture If Indicated - Urine, Clean Catch    4. Smoking    5. Diabetic polyneuropathy associated with type 1 diabetes mellitus (HCC)    Other orders  -     nitrofurantoin, macrocrystal-monohydrate, (Macrobid) 100 MG capsule; Take 1 capsule by mouth 2 (Two) Times a Day.  Dispense: 14 capsule; Refill: 0  -     insulin aspart (NovoLOG FlexPen) 100 UNIT/ML solution pen-injector sc pen; Inject 4 up to 30 units TID with meals based on carb count  Dispense: 10 pen; Refill: 11  -     Glucagon (Gvoke PFS) 1 MG/0.2ML solution prefilled syringe; Inject 1 mg under the skin into the appropriate area as directed As Needed (hypoglycemia) for up to 1 day.  Dispense: 0.4 mL; Refill: 11           Glycemic management     Diabetes mellitus type 1            Dexcom G6--she is using but of sensors                  Component      Latest Ref Rng & Units 5/17/2019   Islet Cell Ab      Neg:<1:1 Negative   BRENDA-65      0.0 - 5.0 U/mL 2607.1 (H)   C-Peptide      1.1 - 4.4 ng/mL 0.6 (L)             omni pod --she has a new pump , has not started     will start today at 4 pm      Basal     MN   0.8         Carb ratio       11      Correction       50     Target bg       150        Previous regimen            Basaglar 24 units daily --stopped today     On work days will take 20 units               Novolog ----   ---      1 unit per 10 grams of CHO on off day s     Work days do 1 unit per 15 grams of CHO                                                   Lipid Management        Patient is not on a statin                      Total Cholesterol   Date Value Ref Range Status   03/03/2021 196 0 - 200 mg/dL Final                Triglycerides   Date Value Ref Range Status   03/03/2021 91 0 - 150 mg/dL Final                HDL Cholesterol   Date Value Ref Range Status   03/03/2021 42 40 - 60 mg/dL Final                LDL Cholesterol    Date Value Ref Range Status   03/03/2021 137 (H) 0 - 100 mg/dL Final         Blood Pressure Management     bp controlled not on ACEi               Microvascular Complication Monitoring        May 2018         microalbuminuria      Ratio - 33 High          neuropathy yes           Taking Gabapentin 400 mg tid daily                Bone Health        Component      Latest Ref Rng & Units 9/3/2020   25 Hydroxy, Vitamin D      30.0 - 100.0 ng/ml 32.2       vitamin d def.        Taking vitamin d supplement            Other Diabetes Related Aspects              Lab Results   Component Value Date     [id number] 1,022 (H) 03/03/2021               Thyroid Health           Lab Results   Component Value Date     TSH 0.462 10/11/2021               Weight Management:        BMI is within normal parameters. No follow-up required.                Preventive Care:     Patient is smoking        Ella Chery Olivrea  reports that she has been smoking cigarettes. She has never used smokeless tobacco.. I have educated her on the risk of diseases from using tobacco products such as cancer, COPD and heart disease.     I advised her to quit and she is not willing to quit.    I spent 3  minutes counseling the patient.             Dysuria     Check u/a today                     Follow Up   No follow-ups on  file.  Patient was given instructions and counseling regarding her condition or for health maintenance advice. Please see specific information pulled into the AVS if appropriate.         This document has been electronically signed by LILLIE Esquivel on May 4, 2022 14:24 CDT.

## 2022-05-05 LAB
BILIRUB UR QL STRIP: NEGATIVE
CLARITY UR: CLEAR
COLOR UR: YELLOW
GLUCOSE UR STRIP-MCNC: ABNORMAL MG/DL
HBA1C MFR BLD: 9 % (ref 4.8–5.6)
HGB UR QL STRIP.AUTO: NEGATIVE
KETONES UR QL STRIP: ABNORMAL
LEUKOCYTE ESTERASE UR QL STRIP.AUTO: NEGATIVE
NITRITE UR QL STRIP: NEGATIVE
PH UR STRIP.AUTO: 7 [PH] (ref 5–8)
PROT UR QL STRIP: NEGATIVE
SP GR UR STRIP: 1.02 (ref 1–1.03)
UROBILINOGEN UR QL STRIP: ABNORMAL

## 2022-05-13 ENCOUNTER — DOCUMENTATION (OUTPATIENT)
Dept: ENDOCRINOLOGY | Facility: CLINIC | Age: 32
End: 2022-05-13

## 2022-06-07 ENCOUNTER — DOCUMENTATION (OUTPATIENT)
Dept: ENDOCRINOLOGY | Facility: CLINIC | Age: 32
End: 2022-06-07

## 2022-08-12 ENCOUNTER — OFFICE VISIT (OUTPATIENT)
Dept: ENDOCRINOLOGY | Facility: CLINIC | Age: 32
End: 2022-08-12

## 2022-08-12 VITALS
BODY MASS INDEX: 22.35 KG/M2 | OXYGEN SATURATION: 97 % | HEART RATE: 100 BPM | DIASTOLIC BLOOD PRESSURE: 64 MMHG | SYSTOLIC BLOOD PRESSURE: 106 MMHG | WEIGHT: 139.1 LBS | HEIGHT: 66 IN

## 2022-08-12 DIAGNOSIS — F17.200 SMOKING: Primary | ICD-10-CM

## 2022-08-12 DIAGNOSIS — E10.65 TYPE 1 DIABETES MELLITUS WITH HYPERGLYCEMIA: ICD-10-CM

## 2022-08-12 DIAGNOSIS — E55.9 VITAMIN D DEFICIENCY: ICD-10-CM

## 2022-08-12 DIAGNOSIS — E10.42 DIABETIC POLYNEUROPATHY ASSOCIATED WITH TYPE 1 DIABETES MELLITUS: ICD-10-CM

## 2022-08-12 PROCEDURE — 99214 OFFICE O/P EST MOD 30 MIN: CPT | Performed by: NURSE PRACTITIONER

## 2022-08-12 PROCEDURE — 95251 CONT GLUC MNTR ANALYSIS I&R: CPT | Performed by: NURSE PRACTITIONER

## 2022-08-12 RX ORDER — INSULIN PMP CART,AUT,G6/7,CNTR
1 EACH SUBCUTANEOUS
Qty: 1 KIT | Refills: 0 | Status: SHIPPED | OUTPATIENT
Start: 2022-08-12 | End: 2022-09-21 | Stop reason: SDUPTHER

## 2022-08-12 RX ORDER — GLUCAGON 3 MG/1
1 POWDER NASAL 2 TIMES DAILY PRN
Qty: 2 EACH | Refills: 11 | Status: SHIPPED | OUTPATIENT
Start: 2022-08-12 | End: 2022-11-22

## 2022-08-12 NOTE — PROGRESS NOTES
"Chief Complaint  Diabetes    Subjective          Ella Olivera presents to [facility] ENDOCRINOLOGY  History of Present Illness       In office visit     32 year old female presents for follow up      Reason diabetes mellitus type 1    Diagnosed in 2017      Timing constant     Quality not controlled     Microvascular complications peripheral neuropathy         Context-- presented to ER for fatigue, difficulty waking up and blood sugar was over 700          Diabetes Regimen--        Now on Omni pod        Blood Glucose Readings     Checks sugars 6 times daily and has been for the last 90 days before dexcom      Has the dexcom now and able to monitor more frequently and having less hypoglycemia        Has been running high     She has had COVID       Review of Systems - General ROS: negative         Objective   Vital Signs:   /64   Pulse 100   Ht 167.6 cm (66\")   Wt 63.1 kg (139 lb 1.6 oz)   SpO2 97%   BMI 22.45 kg/m²     Physical Exam  Constitutional:       Appearance: Normal appearance.   Cardiovascular:      Rate and Rhythm: Regular rhythm.      Heart sounds: Normal heart sounds.   Pulmonary:      Breath sounds: Normal breath sounds.   Musculoskeletal:      Cervical back: Normal range of motion.   Neurological:      Mental Status: She is alert.        Result Review :   The following data was reviewed by: LILLIE Esquivel on 05/04/2022:  Common labs    Common Labsle 10/11/21 10/11/21 10/11/21 10/11/21 12/8/21 12/8/21 5/4/22 5/4/22 5/4/22    1103 1103 1103 1436 0859 0859 1441 1441 1441   Glucose   172 (A)   140 (A)  271 (A)    BUN   14   13  11    Creatinine   0.73   0.73  0.79    eGFR Non  Am   92   92      Sodium   140   139  139    Potassium   4.0   4.1  4.3    Chloride   105   102  101    Calcium   9.3   9.4  9.2    Albumin   4.80   4.50  4.30    Total Bilirubin   0.4   0.2  0.3    Alkaline Phosphatase   71   78  64    AST (SGOT)   19   17  23  "   ALT (SGPT)   18   10  12    WBC 9.76    10.27  10.00     Hemoglobin 13.2    14.0  13.4     Hematocrit 40.1    41.9  39.0     Platelets 281    301  283     Hemoglobin A1C  8.00 (A)       9.00 (A)   Microalbumin, Urine    <1.2        (A) Abnormal value       Comments are available for some flowsheets but are not being displayed.                       Assessment and Plan    Diagnoses and all orders for this visit:    1. Smoking (Primary)    2. Type 1 diabetes mellitus with hyperglycemia (HCC)    3. Vitamin D deficiency    4. Diabetic polyneuropathy associated with type 1 diabetes mellitus (HCC)    Other orders  -     Insulin Disposable Pump (Omnipod 5 G6 Intro, Gen 5,) kit; 1 kit Every 3 (Three) Days.  Dispense: 1 kit; Refill: 0  -     Glucagon (Baqsimi One Pack) 3 MG/DOSE powder; 1 each into the nostril(s) as directed by provider 2 (Two) Times a Day As Needed (Hypoglycemia). Apply intranasal if hypoglycemia  Dispense: 2 each; Refill: 11           Glycemic management     Diabetes mellitus type 1        Lab Results   Component Value Date    HGBA1C 9.00 (H) 05/04/2022         Dexcom G6-    Download and reviewed     Dated from July 30 to August 12, 2022     Average bg 223     31% in range    27% high    41% very high     Less 1 % low    0 % very low        hyperglycemia all the time        she is having her period and running high    Will use a temp basal of 120%             Component      Latest Ref Rng & Units 5/17/2019   Islet Cell Ab      Neg:<1:1 Negative   BRENDA-65      0.0 - 5.0 U/mL 2607.1 (H)   C-Peptide      1.1 - 4.4 ng/mL 0.6 (L)             omni pod --       Basal     MN   0.8         Carb ratio       11      Correction       50     Target bg       150        Previous regimen           Basaglar 24 units daily --stopped today     On work days will take 20 units               Novolog ----   ---      1 unit per 10 grams of CHO on off day s     Work days do 1 unit per 15 grams of CHO         Call in baqsimi             Lipid Management        Patient is not on a statin                      Total Cholesterol   Date Value Ref Range Status   03/03/2021 196 0 - 200 mg/dL Final                Triglycerides   Date Value Ref Range Status   03/03/2021 91 0 - 150 mg/dL Final                HDL Cholesterol   Date Value Ref Range Status   03/03/2021 42 40 - 60 mg/dL Final                LDL Cholesterol    Date Value Ref Range Status   03/03/2021 137 (H) 0 - 100 mg/dL Final         Blood Pressure Management     bp controlled not on ACEi               Microvascular Complication Monitoring        May 2018         microalbuminuria      Ratio - 33 High          neuropathy yes           Taking Gabapentin 400 mg tid daily                Bone Health        Component      Latest Ref Rng & Units 9/3/2020   25 Hydroxy, Vitamin D      30.0 - 100.0 ng/ml 32.2       vitamin d def.        Taking vitamin d supplement            Other Diabetes Related Aspects              Lab Results   Component Value Date     [id number] 1,022 (H) 03/03/2021               Thyroid Health           Lab Results   Component Value Date     TSH 0.462 10/11/2021               Weight Management:     Body mass index is 22.45 kg/m².                  Preventive Care:     Patient is smoking        Ella Chery Olivera  reports that she has been smoking cigarettes. She has never used smokeless tobacco.. I have educated her on the risk of diseases from using tobacco products such as cancer, COPD and heart disease.     I advised her to quit and she is not willing to quit.    I spent 3  minutes counseling the patient.                         Follow Up   Return in about 3 months (around 11/12/2022) for Recheck.  Patient was given instructions and counseling regarding her condition or for health maintenance advice. Please see specific information pulled into the AVS if appropriate.         This document has been electronically signed by LILLIE Esquivel on August 12,  2022 09:03 T.

## 2022-09-21 ENCOUNTER — TELEPHONE (OUTPATIENT)
Dept: ENDOCRINOLOGY | Facility: CLINIC | Age: 32
End: 2022-09-21

## 2022-09-21 DIAGNOSIS — E10.65 TYPE 1 DIABETES MELLITUS WITH HYPERGLYCEMIA: Primary | ICD-10-CM

## 2022-09-21 RX ORDER — INSULIN PUMP CONTROLLER
1 EACH MISCELLANEOUS
Qty: 10 EACH | Refills: 2 | Status: SHIPPED | OUTPATIENT
Start: 2022-09-21 | End: 2022-11-08 | Stop reason: SDUPTHER

## 2022-09-21 RX ORDER — INSULIN PMP CART,AUT,G6/7,CNTR
1 EACH SUBCUTANEOUS
Qty: 1 KIT | Refills: 0 | Status: SHIPPED | OUTPATIENT
Start: 2022-09-21

## 2022-09-21 NOTE — TELEPHONE ENCOUNTER
Spoke with patient, she said [facility] did not have the Omnipod 5 in stock but could get it. I informed patient that I would send RX for Omnipod DASH pods along with the Omnipod Gen 5 intro kit, in the case that she would run out of DASH before they could get the Gen 5 in stock.

## 2022-09-21 NOTE — TELEPHONE ENCOUNTER
Patient called stating [facility] can not get the OMNIPOD Gen 5, she wants to know if it can be sent to [facility] in [place]. She also needs a prescription for OMNIPOD DASH until she can get the GEN 5    Thanks

## 2022-10-10 ENCOUNTER — DOCUMENTATION (OUTPATIENT)
Dept: ENDOCRINOLOGY | Facility: CLINIC | Age: 32
End: 2022-10-10

## 2022-10-10 ENCOUNTER — TELEPHONE (OUTPATIENT)
Dept: ENDOCRINOLOGY | Facility: CLINIC | Age: 32
End: 2022-10-10

## 2022-10-10 NOTE — TELEPHONE ENCOUNTER
Pt called stating she has had the new omni pod 5 for a couple of weeks now and she has gone through all the training, but said the pump is not working right and thinks she may need some help with the settings.    Please advise.    Thanks.

## 2022-10-27 ENCOUNTER — OFFICE VISIT (OUTPATIENT)
Dept: ENDOCRINOLOGY | Facility: CLINIC | Age: 32
End: 2022-10-27

## 2022-10-27 DIAGNOSIS — E10.65 TYPE 1 DIABETES MELLITUS WITH HYPERGLYCEMIA: ICD-10-CM

## 2022-10-27 NOTE — PROGRESS NOTES
This will be a no charge.    Pt reports higher BG since starting Omnipod 5 insulin pump. Dexcom report scanned into chart, showing high/very high 91% of time. Insulin pump settings reviewed. Insulin pump settings entered incorrectly during self-training as basal MN 0.1, ICR 1:11, Correction 1:50 over 190. Correct pump settings entered into pump as basal MN 0.8, ICR 1:11, Correction 1:150 over 150. Time spent with patient was 20 minutes.    SHREE Eden, RD, LD

## 2022-11-08 DIAGNOSIS — E10.65 TYPE 1 DIABETES MELLITUS WITH HYPERGLYCEMIA: ICD-10-CM

## 2022-11-08 RX ORDER — INSULIN PUMP CONTROLLER
1 EACH MISCELLANEOUS
Qty: 10 EACH | Refills: 2 | Status: SHIPPED | OUTPATIENT
Start: 2022-11-08

## 2022-11-08 NOTE — TELEPHONE ENCOUNTER
Incoming Refill Request      Medication requested (name and dose): Insulin Disposable Pump (Omnipod DASH Pods, Gen 4,) [facility]    Pharmacy where request should be sent: [facility]    Additional details provided by patient:     Best call back number: [phone]    Does the patient have less than a 3 day supply:  [x] Yes  [] No    Hemant Thomason Rep  11/08/22, 08:10 CST

## 2022-11-10 RX ORDER — INSULIN PMP CART,AUT,G6/7,CNTR
1 EACH SUBCUTANEOUS
Qty: 10 EACH | Refills: 6 | Status: SHIPPED | OUTPATIENT
Start: 2022-11-10

## 2022-11-22 ENCOUNTER — LAB (OUTPATIENT)
Dept: LAB | Facility: HOSPITAL | Age: 32
End: 2022-11-22

## 2022-11-22 ENCOUNTER — OFFICE VISIT (OUTPATIENT)
Dept: ENDOCRINOLOGY | Facility: CLINIC | Age: 32
End: 2022-11-22

## 2022-11-22 VITALS
HEART RATE: 82 BPM | HEIGHT: 66 IN | DIASTOLIC BLOOD PRESSURE: 64 MMHG | SYSTOLIC BLOOD PRESSURE: 110 MMHG | WEIGHT: 152 LBS | OXYGEN SATURATION: 99 % | BODY MASS INDEX: 24.43 KG/M2

## 2022-11-22 DIAGNOSIS — E55.9 VITAMIN D DEFICIENCY: ICD-10-CM

## 2022-11-22 DIAGNOSIS — E10.42 DIABETIC POLYNEUROPATHY ASSOCIATED WITH TYPE 1 DIABETES MELLITUS: ICD-10-CM

## 2022-11-22 DIAGNOSIS — F17.200 SMOKING: ICD-10-CM

## 2022-11-22 DIAGNOSIS — R30.0 DYSURIA: ICD-10-CM

## 2022-11-22 DIAGNOSIS — E10.65 TYPE 1 DIABETES MELLITUS WITH HYPERGLYCEMIA: Primary | ICD-10-CM

## 2022-11-22 LAB
25(OH)D3 SERPL-MCNC: 28.8 NG/ML (ref 30–100)
ALBUMIN SERPL-MCNC: 4.4 G/DL (ref 3.5–5.2)
ALBUMIN UR-MCNC: <1.2 MG/DL
ALBUMIN/GLOB SERPL: 1.8 G/DL
ALP SERPL-CCNC: 93 U/L (ref 39–117)
ALT SERPL W P-5'-P-CCNC: 19 U/L (ref 1–33)
ANION GAP SERPL CALCULATED.3IONS-SCNC: 9 MMOL/L (ref 5–15)
AST SERPL-CCNC: 27 U/L (ref 1–32)
BASOPHILS # BLD AUTO: 0.09 10*3/MM3 (ref 0–0.2)
BASOPHILS NFR BLD AUTO: 0.9 % (ref 0–1.5)
BILIRUB SERPL-MCNC: 0.3 MG/DL (ref 0–1.2)
BILIRUB UR QL STRIP: NEGATIVE
BUN SERPL-MCNC: 13 MG/DL (ref 6–20)
BUN/CREAT SERPL: 17.6 (ref 7–25)
CALCIUM SPEC-SCNC: 9.4 MG/DL (ref 8.6–10.5)
CHLORIDE SERPL-SCNC: 104 MMOL/L (ref 98–107)
CLARITY UR: CLEAR
CO2 SERPL-SCNC: 27 MMOL/L (ref 22–29)
COLOR UR: YELLOW
CREAT SERPL-MCNC: 0.74 MG/DL (ref 0.57–1)
CREAT UR-MCNC: 78.3 MG/DL
DEPRECATED RDW RBC AUTO: 39.1 FL (ref 37–54)
EGFRCR SERPLBLD CKD-EPI 2021: 109 ML/MIN/1.73
EOSINOPHIL # BLD AUTO: 0.27 10*3/MM3 (ref 0–0.4)
EOSINOPHIL NFR BLD AUTO: 2.6 % (ref 0.3–6.2)
ERYTHROCYTE [DISTWIDTH] IN BLOOD BY AUTOMATED COUNT: 12 % (ref 12.3–15.4)
GLOBULIN UR ELPH-MCNC: 2.5 GM/DL
GLUCOSE SERPL-MCNC: 141 MG/DL (ref 65–99)
GLUCOSE UR STRIP-MCNC: ABNORMAL MG/DL
HBA1C MFR BLD: 9 % (ref 4.8–5.6)
HCT VFR BLD AUTO: 41.8 % (ref 34–46.6)
HGB BLD-MCNC: 14 G/DL (ref 12–15.9)
HGB UR QL STRIP.AUTO: NEGATIVE
IMM GRANULOCYTES # BLD AUTO: 0.03 10*3/MM3 (ref 0–0.05)
IMM GRANULOCYTES NFR BLD AUTO: 0.3 % (ref 0–0.5)
KETONES UR QL STRIP: NEGATIVE
LEUKOCYTE ESTERASE UR QL STRIP.AUTO: NEGATIVE
LYMPHOCYTES # BLD AUTO: 1.69 10*3/MM3 (ref 0.7–3.1)
LYMPHOCYTES NFR BLD AUTO: 16.4 % (ref 19.6–45.3)
MCH RBC QN AUTO: 29.6 PG (ref 26.6–33)
MCHC RBC AUTO-ENTMCNC: 33.5 G/DL (ref 31.5–35.7)
MCV RBC AUTO: 88.4 FL (ref 79–97)
MICROALBUMIN/CREAT UR: NORMAL MG/G{CREAT}
MONOCYTES # BLD AUTO: 0.71 10*3/MM3 (ref 0.1–0.9)
MONOCYTES NFR BLD AUTO: 6.9 % (ref 5–12)
NEUTROPHILS NFR BLD AUTO: 7.49 10*3/MM3 (ref 1.7–7)
NEUTROPHILS NFR BLD AUTO: 72.9 % (ref 42.7–76)
NITRITE UR QL STRIP: NEGATIVE
NRBC BLD AUTO-RTO: 0 /100 WBC (ref 0–0.2)
PH UR STRIP.AUTO: 7.5 [PH] (ref 5–8)
PLATELET # BLD AUTO: 295 10*3/MM3 (ref 140–450)
PMV BLD AUTO: 10.5 FL (ref 6–12)
POTASSIUM SERPL-SCNC: 4.2 MMOL/L (ref 3.5–5.2)
PROT SERPL-MCNC: 6.9 G/DL (ref 6–8.5)
PROT UR QL STRIP: NEGATIVE
RBC # BLD AUTO: 4.73 10*6/MM3 (ref 3.77–5.28)
SODIUM SERPL-SCNC: 140 MMOL/L (ref 136–145)
SP GR UR STRIP: 1.02 (ref 1–1.03)
TSH SERPL DL<=0.05 MIU/L-ACNC: 0.77 UIU/ML (ref 0.27–4.2)
UROBILINOGEN UR QL STRIP: ABNORMAL
WBC NRBC COR # BLD: 10.28 10*3/MM3 (ref 3.4–10.8)

## 2022-11-22 PROCEDURE — 80050 GENERAL HEALTH PANEL: CPT | Performed by: NURSE PRACTITIONER

## 2022-11-22 PROCEDURE — 36415 COLL VENOUS BLD VENIPUNCTURE: CPT | Performed by: NURSE PRACTITIONER

## 2022-11-22 PROCEDURE — 82043 UR ALBUMIN QUANTITATIVE: CPT | Performed by: NURSE PRACTITIONER

## 2022-11-22 PROCEDURE — 82306 VITAMIN D 25 HYDROXY: CPT | Performed by: NURSE PRACTITIONER

## 2022-11-22 PROCEDURE — 81003 URINALYSIS AUTO W/O SCOPE: CPT | Performed by: NURSE PRACTITIONER

## 2022-11-22 PROCEDURE — 82570 ASSAY OF URINE CREATININE: CPT | Performed by: NURSE PRACTITIONER

## 2022-11-22 PROCEDURE — 99214 OFFICE O/P EST MOD 30 MIN: CPT | Performed by: NURSE PRACTITIONER

## 2022-11-22 PROCEDURE — 83036 HEMOGLOBIN GLYCOSYLATED A1C: CPT | Performed by: NURSE PRACTITIONER

## 2022-11-22 PROCEDURE — 95251 CONT GLUC MNTR ANALYSIS I&R: CPT | Performed by: NURSE PRACTITIONER

## 2022-11-22 RX ORDER — GLUCAGON 3 MG/1
1 POWDER NASAL AS NEEDED
Qty: 2 EACH | Refills: 11 | Status: SHIPPED | OUTPATIENT
Start: 2022-11-22

## 2022-11-22 NOTE — PROGRESS NOTES
"Chief Complaint  Diabetes    Subjective          Ella Olivera presents to [facility] ENDOCRINOLOGY  Diabetes         In office visit     32 year old female presents for follow up      Reason diabetes mellitus type 1    Diagnosed in 2017      Timing constant     Quality not controlled     Microvascular complications peripheral neuropathy         Context-- presented to ER for fatigue, difficulty waking up and blood sugar was over 700          Diabetes Regimen--        Now on Omni pod        Blood Glucose Readings     Checks sugars 6 times daily and has been for the last 90 days before dexcom      Dexcom     See below       Review of Systems - General ROS: negative         Objective   Vital Signs:   /64   Pulse 82   Ht 167.6 cm (66\")   Wt 68.9 kg (152 lb)   SpO2 99%   BMI 24.53 kg/m²     Physical Exam  Constitutional:       Appearance: Normal appearance.   Cardiovascular:      Rate and Rhythm: Regular rhythm.      Heart sounds: Normal heart sounds.   Pulmonary:      Breath sounds: Normal breath sounds.   Musculoskeletal:      Cervical back: Normal range of motion.   Neurological:      Mental Status: She is alert.        Result Review :   The following data was reviewed by: LILLIE Esquivel on 05/04/2022:  Common labs    Common Labs 12/8/21 12/8/21 5/4/22 5/4/22 5/4/22    0859 0859 1441 1441 1441   Glucose  140 (A)  271 (A)    BUN  13  11    Creatinine  0.73  0.79    eGFR Non African Am  92      Sodium  139  139    Potassium  4.1  4.3    Chloride  102  101    Calcium  9.4  9.2    Albumin  4.50  4.30    Total Bilirubin  0.2  0.3    Alkaline Phosphatase  78  64    AST (SGOT)  17  23    ALT (SGPT)  10  12    WBC 10.27  10.00     Hemoglobin 14.0  13.4     Hematocrit 41.9  39.0     Platelets 301  283     Hemoglobin A1C     9.00 (A)   (A) Abnormal value       Comments are available for some flowsheets but are not being displayed.                       Assessment and " Plan    Diagnoses and all orders for this visit:    1. Type 1 diabetes mellitus with hyperglycemia (HCC) (Primary)  -     CBC & Differential  -     Comprehensive Metabolic Panel  -     Hemoglobin A1c  -     Microalbumin / Creatinine Urine Ratio - Urine, Clean Catch  -     TSH  -     Vitamin D,25-Hydroxy    2. Vitamin D deficiency  -     CBC & Differential  -     Comprehensive Metabolic Panel  -     Hemoglobin A1c  -     Microalbumin / Creatinine Urine Ratio - Urine, Clean Catch  -     TSH  -     Vitamin D,25-Hydroxy    3. Diabetic polyneuropathy associated with type 1 diabetes mellitus (HCC)  -     CBC & Differential  -     Comprehensive Metabolic Panel  -     Hemoglobin A1c  -     Microalbumin / Creatinine Urine Ratio - Urine, Clean Catch  -     TSH  -     Vitamin D,25-Hydroxy    4. Smoking    5. Dysuria  -     Urinalysis With Culture If Indicated - Urine, Clean Catch    Other orders  -     Glucagon (Baqsimi One Pack) 3 MG/DOSE powder; 1 each into the nostril(s) as directed by provider As Needed (hypoglycemia). Apply intranasal if hypoglycemia  Dispense: 2 each; Refill: 11           Glycemic management     Diabetes mellitus type 1        Lab Results   Component Value Date    HGBA1C 9.00 (H) 05/04/2022     Component      Latest Ref Rng & Units 5/17/2019   Islet Cell Ab      Neg:<1:1 Negative   BRENDA-65      0.0 - 5.0 U/mL 2607.1 (H)   C-Peptide      1.1 - 4.4 ng/mL 0.6 (L)          Dexcom G6-    Download and reviewed     Dated from Nov. 9 to Nov. 22, 2022    Average bg 232    Time in target 27%     High 32%     Very high 41%     Low 0 %     Very low 0%     Hyperglycemia         74% basal and only 26% bolus     She is not bolusing for meals or snacks    No changes to settings, bolus for meals       On Omnipod 5               omni pod --       Basal     MN   0.8         Carb ratio       11      Correction       50     Target bg       150                  Previous regimen           Basaglar 24 units daily --stopped  today     On work days will take 20 units               Novolog ----   ---      1 unit per 10 grams of CHO on off day s     Work days do 1 unit per 15 grams of CHO         Call in baqsimi            Lipid Management        Patient is not on a statin                      Total Cholesterol   Date Value Ref Range Status   03/03/2021 196 0 - 200 mg/dL Final                Triglycerides   Date Value Ref Range Status   03/03/2021 91 0 - 150 mg/dL Final                HDL Cholesterol   Date Value Ref Range Status   03/03/2021 42 40 - 60 mg/dL Final                LDL Cholesterol    Date Value Ref Range Status   03/03/2021 137 (H) 0 - 100 mg/dL Final         Blood Pressure Management     bp controlled not on ACEi               Microvascular Complication Monitoring        May 2018         microalbuminuria      Ratio - 33 High          neuropathy yes           Taking Gabapentin 400 mg tid daily                Bone Health        Component      Latest Ref Rng & Units 9/3/2020   25 Hydroxy, Vitamin D      30.0 - 100.0 ng/ml 32.2       vitamin d def.        Taking vitamin d supplement            Other Diabetes Related Aspects              Lab Results   Component Value Date     [id number] 1,022 (H) 03/03/2021               Thyroid Health           Lab Results   Component Value Date     TSH 0.462 10/11/2021               Weight Management:     Body mass index is 24.53 kg/m².                  Preventive Care:     Patient is smoking        Ella Chery Olivera  reports that she has been smoking cigarettes. She has never used smokeless tobacco.. I have educated her on the risk of diseases from using tobacco products such as cancer, COPD and heart disease.     I advised her to quit and she is not willing to quit.    I spent 3  minutes counseling the patient.           Dysuria    Check u/a               Follow Up   Return in about 3 months (around 2/22/2023) for Recheck.  Patient was given instructions and counseling regarding her  condition or for health maintenance advice. Please see specific information pulled into the AVS if appropriate.         This document has been electronically signed by LILLIE Esquivel on November 22, 2022 09:08 CST.

## 2022-11-23 ENCOUNTER — TELEPHONE (OUTPATIENT)
Dept: ENDOCRINOLOGY | Facility: CLINIC | Age: 32
End: 2022-11-23

## 2022-11-23 NOTE — TELEPHONE ENCOUNTER
----- Message from LILLIE Esquivel sent at 11/23/2022  8:03 AM CST -----  9% same as last time please remember to bolus for your meals, showed no infection, vitamin D is low at 28.8 just make sure to take vitamin D replacement if not already  Thyroid level is normal

## 2023-01-12 ENCOUNTER — DOCUMENTATION (OUTPATIENT)
Dept: ENDOCRINOLOGY | Facility: CLINIC | Age: 33
End: 2023-01-12
Payer: MEDICAID

## 2023-02-27 ENCOUNTER — OFFICE VISIT (OUTPATIENT)
Dept: ENDOCRINOLOGY | Facility: CLINIC | Age: 33
End: 2023-02-27
Payer: MEDICAID

## 2023-02-27 ENCOUNTER — LAB (OUTPATIENT)
Dept: LAB | Facility: HOSPITAL | Age: 33
End: 2023-02-27
Payer: MEDICAID

## 2023-02-27 VITALS
SYSTOLIC BLOOD PRESSURE: 118 MMHG | BODY MASS INDEX: 24.64 KG/M2 | HEIGHT: 66 IN | DIASTOLIC BLOOD PRESSURE: 78 MMHG | HEART RATE: 77 BPM | OXYGEN SATURATION: 97 % | WEIGHT: 153.3 LBS

## 2023-02-27 DIAGNOSIS — F17.200 SMOKER: ICD-10-CM

## 2023-02-27 DIAGNOSIS — R53.83 FATIGUE, UNSPECIFIED TYPE: ICD-10-CM

## 2023-02-27 DIAGNOSIS — E10.65 TYPE 1 DIABETES MELLITUS WITH HYPERGLYCEMIA: Primary | ICD-10-CM

## 2023-02-27 DIAGNOSIS — E55.9 VITAMIN D DEFICIENCY: ICD-10-CM

## 2023-02-27 LAB
25(OH)D3 SERPL-MCNC: 23.3 NG/ML (ref 30–100)
ALBUMIN SERPL-MCNC: 4.2 G/DL (ref 3.5–5.2)
ALBUMIN/GLOB SERPL: 1.8 G/DL
ALP SERPL-CCNC: 75 U/L (ref 39–117)
ALT SERPL W P-5'-P-CCNC: 10 U/L (ref 1–33)
ANION GAP SERPL CALCULATED.3IONS-SCNC: 10 MMOL/L (ref 5–15)
AST SERPL-CCNC: 15 U/L (ref 1–32)
BASOPHILS # BLD AUTO: 0.1 10*3/MM3 (ref 0–0.2)
BASOPHILS NFR BLD AUTO: 1.1 % (ref 0–1.5)
BILIRUB SERPL-MCNC: 0.2 MG/DL (ref 0–1.2)
BUN SERPL-MCNC: 13 MG/DL (ref 6–20)
BUN/CREAT SERPL: 16.5 (ref 7–25)
CALCIUM SPEC-SCNC: 8.8 MG/DL (ref 8.6–10.5)
CHLORIDE SERPL-SCNC: 104 MMOL/L (ref 98–107)
CO2 SERPL-SCNC: 25 MMOL/L (ref 22–29)
CREAT SERPL-MCNC: 0.79 MG/DL (ref 0.57–1)
DEPRECATED RDW RBC AUTO: 38.8 FL (ref 37–54)
EGFRCR SERPLBLD CKD-EPI 2021: 100.2 ML/MIN/1.73
EOSINOPHIL # BLD AUTO: 0.25 10*3/MM3 (ref 0–0.4)
EOSINOPHIL NFR BLD AUTO: 2.6 % (ref 0.3–6.2)
ERYTHROCYTE [DISTWIDTH] IN BLOOD BY AUTOMATED COUNT: 12.1 % (ref 12.3–15.4)
GLOBULIN UR ELPH-MCNC: 2.3 GM/DL
GLUCOSE SERPL-MCNC: 310 MG/DL (ref 65–99)
HBA1C MFR BLD: 9.3 % (ref 4.8–5.6)
HCT VFR BLD AUTO: 39.7 % (ref 34–46.6)
HGB BLD-MCNC: 13.4 G/DL (ref 12–15.9)
IMM GRANULOCYTES # BLD AUTO: 0.02 10*3/MM3 (ref 0–0.05)
IMM GRANULOCYTES NFR BLD AUTO: 0.2 % (ref 0–0.5)
IRON 24H UR-MRATE: 38 MCG/DL (ref 37–145)
IRON SATN MFR SERPL: 11 % (ref 20–50)
LYMPHOCYTES # BLD AUTO: 1.94 10*3/MM3 (ref 0.7–3.1)
LYMPHOCYTES NFR BLD AUTO: 20.5 % (ref 19.6–45.3)
MCH RBC QN AUTO: 29.4 PG (ref 26.6–33)
MCHC RBC AUTO-ENTMCNC: 33.8 G/DL (ref 31.5–35.7)
MCV RBC AUTO: 87.1 FL (ref 79–97)
MONOCYTES # BLD AUTO: 0.55 10*3/MM3 (ref 0.1–0.9)
MONOCYTES NFR BLD AUTO: 5.8 % (ref 5–12)
NEUTROPHILS NFR BLD AUTO: 6.61 10*3/MM3 (ref 1.7–7)
NEUTROPHILS NFR BLD AUTO: 69.8 % (ref 42.7–76)
NRBC BLD AUTO-RTO: 0 /100 WBC (ref 0–0.2)
PLATELET # BLD AUTO: 322 10*3/MM3 (ref 140–450)
PMV BLD AUTO: 10.3 FL (ref 6–12)
POTASSIUM SERPL-SCNC: 4.3 MMOL/L (ref 3.5–5.2)
PROT SERPL-MCNC: 6.5 G/DL (ref 6–8.5)
RBC # BLD AUTO: 4.56 10*6/MM3 (ref 3.77–5.28)
SODIUM SERPL-SCNC: 139 MMOL/L (ref 136–145)
TIBC SERPL-MCNC: 337 MCG/DL (ref 298–536)
TRANSFERRIN SERPL-MCNC: 226 MG/DL (ref 200–360)
TSH SERPL DL<=0.05 MIU/L-ACNC: 0.44 UIU/ML (ref 0.27–4.2)
VIT B12 BLD-MCNC: 989 PG/ML (ref 211–946)
WBC NRBC COR # BLD: 9.47 10*3/MM3 (ref 3.4–10.8)

## 2023-02-27 PROCEDURE — 95251 CONT GLUC MNTR ANALYSIS I&R: CPT | Performed by: NURSE PRACTITIONER

## 2023-02-27 PROCEDURE — 83540 ASSAY OF IRON: CPT | Performed by: NURSE PRACTITIONER

## 2023-02-27 PROCEDURE — 36415 COLL VENOUS BLD VENIPUNCTURE: CPT | Performed by: NURSE PRACTITIONER

## 2023-02-27 PROCEDURE — 82306 VITAMIN D 25 HYDROXY: CPT | Performed by: NURSE PRACTITIONER

## 2023-02-27 PROCEDURE — 82607 VITAMIN B-12: CPT | Performed by: NURSE PRACTITIONER

## 2023-02-27 PROCEDURE — 84466 ASSAY OF TRANSFERRIN: CPT | Performed by: NURSE PRACTITIONER

## 2023-02-27 PROCEDURE — 99214 OFFICE O/P EST MOD 30 MIN: CPT | Performed by: NURSE PRACTITIONER

## 2023-02-27 PROCEDURE — 80050 GENERAL HEALTH PANEL: CPT | Performed by: NURSE PRACTITIONER

## 2023-02-27 PROCEDURE — 83036 HEMOGLOBIN GLYCOSYLATED A1C: CPT | Performed by: NURSE PRACTITIONER

## 2023-02-27 RX ORDER — ALBUTEROL SULFATE 90 UG/1
2 AEROSOL, METERED RESPIRATORY (INHALATION) EVERY 4 HOURS PRN
Qty: 9 G | Refills: 11 | Status: SHIPPED | OUTPATIENT
Start: 2023-02-27

## 2023-02-27 NOTE — PROGRESS NOTES
"Chief Complaint  Diabetes    Subjective          Ella Olivera presents to [facility] ENDOCRINOLOGY  Diabetes         In office visit     33 year old presents for follow up      Reason diabetes mellitus type 1    Diagnosed in 2017     Context-- presented to ER for fatigue, difficulty waking up and blood sugar was over 700          Timing constant     Quality not controlled     Microvascular complications peripheral neuropathy          Diabetes Regimen--        Now on Omni pod        Blood Glucose Readings     Checks sugars 6 times daily and has been for the last 90 days before dexcom      Dexcom     See below     Review of Systems - General ROS: negative                 Objective   Vital Signs:   /78   Pulse 77   Ht 167.6 cm (66\")   Wt 69.5 kg (153 lb 4.8 oz)   SpO2 97%   BMI 24.74 kg/m²     Physical Exam  Constitutional:       Appearance: Normal appearance.   Cardiovascular:      Rate and Rhythm: Regular rhythm.      Heart sounds: Normal heart sounds.   Pulmonary:      Breath sounds: Normal breath sounds.   Musculoskeletal:      Cervical back: Normal range of motion.   Neurological:      Mental Status: She is alert.        Result Review :   The following data was reviewed by: LILLIE Esquivel on 05/04/2022:  Common labs    Common Labs 5/4/22 5/4/22 5/4/22 11/22/22 11/22/22 11/22/22 11/22/22    1441 1441 1441 0928 0928 0928 0959   Glucose  271 (A)   141 (A)     BUN  11   13     Creatinine  0.79   0.74     Sodium  139   140     Potassium  4.3   4.2     Chloride  101   104     Calcium  9.2   9.4     Albumin  4.30   4.40     Total Bilirubin  0.3   0.3     Alkaline Phosphatase  64   93     AST (SGOT)  23   27     ALT (SGPT)  12   19     WBC 10.00   10.28      Hemoglobin 13.4   14.0      Hematocrit 39.0   41.8      Platelets 283   295      Hemoglobin A1C   9.00 (A)   9.00 (A)    Microalbumin, Urine       <1.2   (A) Abnormal value       Comments are available for " some flowsheets but are not being displayed.                       Assessment and Plan    Diagnoses and all orders for this visit:    1. Type 1 diabetes mellitus with hyperglycemia (HCC) (Primary)  -     CBC & Differential  -     Comprehensive Metabolic Panel  -     Hemoglobin A1c  -     TSH  -     Vitamin B12  -     Vitamin D,25-Hydroxy  -     Iron Profile    2. Vitamin D deficiency  -     CBC & Differential  -     Comprehensive Metabolic Panel  -     Hemoglobin A1c  -     TSH  -     Vitamin B12  -     Vitamin D,25-Hydroxy  -     Iron Profile    3. Fatigue, unspecified type  -     CBC & Differential  -     Comprehensive Metabolic Panel  -     Hemoglobin A1c  -     TSH  -     Vitamin B12  -     Vitamin D,25-Hydroxy  -     Iron Profile    4. Smoker    Other orders  -     Ventolin  (90 Base) MCG/ACT inhaler; Inhale 2 puffs Every 4 (Four) Hours As Needed for Wheezing.  Dispense: 9 g; Refill: 11           Glycemic management     Diabetes mellitus type 1        Lab Results   Component Value Date    HGBA1C 9.00 (H) 11/22/2022     Component      Latest Ref Rng & Units 5/17/2019   Islet Cell Ab      Neg:<1:1 Negative   BRENDA-65      0.0 - 5.0 U/mL 2607.1 (H)   C-Peptide      1.1 - 4.4 ng/mL 0.6 (L)          Dexcom G6-            Ambulatory Glucose Profile Report    Days Analyzed : 2 week period ending on 02/27/23      Continuous Glucose Monitory Device:  Dexcom G6     - 15% very high target range  - 37% high target range  -       47% in target range   Less than 1 % below target range  - GMI N/A %  - Average glucose 193 mg/dl    Interpretation : Diabetes Type 1        She is doing better since on omnipod     She does have highs with meals , when forgets to bolus    No changes today       On Omnipod 5               omni pod --       Basal     MN   0.8         Carb ratio       11      Correction       50     Target bg       150                  Previous regimen           Basaglar 24 units daily --stopped today     On work  days will take 20 units               Novolog ----   ---      1 unit per 10 grams of CHO on off day s     Work days do 1 unit per 15 grams of CHO         [facility]            Lipid Management        Patient is not on a statin                      Total Cholesterol   Date Value Ref Range Status   03/03/2021 196 0 - 200 mg/dL Final                Triglycerides   Date Value Ref Range Status   03/03/2021 91 0 - 150 mg/dL Final                HDL Cholesterol   Date Value Ref Range Status   03/03/2021 42 40 - 60 mg/dL Final                LDL Cholesterol    Date Value Ref Range Status   03/03/2021 137 (H) 0 - 100 mg/dL Final         Blood Pressure Management     bp controlled not on ACEi               Microvascular Complication Monitoring        May 2018         microalbuminuria      Ratio - 33 High          neuropathy yes           Taking Gabapentin 400 mg tid daily                Bone Health        Component      Latest Ref Rng & Units 9/3/2020   25 Hydroxy, Vitamin D      30.0 - 100.0 ng/ml 32.2       vitamin d def.        Taking vitamin d supplement            Other Diabetes Related Aspects        Lab Results   Component Value Date    [id number] 1,022 (H) 03/03/2021             Thyroid Health     Lab Results   Component Value Date    TSH 0.766 11/22/2022             Weight Management:    Normal weight        Body mass index is 24.74 kg/m².                  Preventive Care:     Patient is smoking        Ella Chery Olivera  reports that she has been smoking cigarettes. She has never used smokeless tobacco.. I have educated her on the risk of diseases from using tobacco products such as cancer, COPD and heart disease.     I advised her to quit and she is not willing to quit.    I spent 3  minutes counseling the patient.                       Follow Up   No follow-ups on file.  Patient was given instructions and counseling regarding her condition or for health maintenance advice. Please see specific information  pulled into the AVS if appropriate.         This document has been electronically signed by LILLIE Esquivel on February 27, 2023 08:53 CST.

## 2023-03-02 ENCOUNTER — TELEPHONE (OUTPATIENT)
Dept: ENDOCRINOLOGY | Facility: CLINIC | Age: 33
End: 2023-03-02
Payer: MEDICAID

## 2023-03-02 NOTE — TELEPHONE ENCOUNTER
----- Message from LILLIE Esquivel sent at 2/28/2023  7:55 AM CST -----  Please let her know vitamin D is low at 23 normals 30 she needs to start over-the-counter vitamin D, her percentage of iron is also low so she needs to start iron over-the-counter 325 mg is fine, her A1c was 9.3, reminder to bolus before each meal, and thyroid was normal

## 2023-04-19 ENCOUNTER — DOCUMENTATION (OUTPATIENT)
Dept: ENDOCRINOLOGY | Facility: CLINIC | Age: 33
End: 2023-04-19
Payer: MEDICAID

## 2023-04-27 ENCOUNTER — DOCUMENTATION (OUTPATIENT)
Dept: ENDOCRINOLOGY | Facility: CLINIC | Age: 33
End: 2023-04-27
Payer: MEDICAID

## 2023-04-27 NOTE — PROGRESS NOTES
PA FOR [facility] SUBMITTED VIA COVER [employer].    [facility]VOKE APPROVED FROM 04/27/2023 TO 04/26/2024

## 2023-06-07 RX ORDER — INSULIN ASPART 100 [IU]/ML
INJECTION, SOLUTION INTRAVENOUS; SUBCUTANEOUS
Qty: 30 ML | Refills: 3 | Status: SHIPPED | OUTPATIENT
Start: 2023-06-07

## 2023-06-15 ENCOUNTER — OFFICE VISIT (OUTPATIENT)
Dept: ENDOCRINOLOGY | Facility: CLINIC | Age: 33
End: 2023-06-15
Payer: MEDICAID

## 2023-06-15 VITALS
WEIGHT: 149.2 LBS | OXYGEN SATURATION: 99 % | DIASTOLIC BLOOD PRESSURE: 80 MMHG | HEIGHT: 66 IN | SYSTOLIC BLOOD PRESSURE: 110 MMHG | HEART RATE: 77 BPM | BODY MASS INDEX: 23.98 KG/M2

## 2023-06-15 DIAGNOSIS — F17.200 SMOKER: ICD-10-CM

## 2023-06-15 DIAGNOSIS — E55.9 VITAMIN D DEFICIENCY: ICD-10-CM

## 2023-06-15 DIAGNOSIS — M79.671 PAIN IN BOTH FEET: Primary | ICD-10-CM

## 2023-06-15 DIAGNOSIS — E10.65 TYPE 1 DIABETES MELLITUS WITH HYPERGLYCEMIA: ICD-10-CM

## 2023-06-15 DIAGNOSIS — M79.672 PAIN IN BOTH FEET: Primary | ICD-10-CM

## 2023-06-15 DIAGNOSIS — E10.43 DIABETIC AUTONOMIC NEUROPATHY ASSOCIATED WITH TYPE 1 DIABETES MELLITUS: ICD-10-CM

## 2023-06-15 RX ORDER — GABAPENTIN 100 MG/1
100 CAPSULE ORAL 3 TIMES DAILY
Qty: 90 CAPSULE | Refills: 3 | Status: SHIPPED | OUTPATIENT
Start: 2023-06-15 | End: 2024-06-14

## 2023-06-15 NOTE — PROGRESS NOTES
"Chief Complaint  Diabetes    Subjective          Ella Chery Olivera presents to [facility] ENDOCRINOLOGY  Diabetes       In office visit     33 year old presents for follow up     Diabetes mellitus type 1    Duration 2017    Timing constant     Quality not controlled     Severity moderate     Complications neuropathy    Current regimen     Insulin     Uses omnipod 5     Glucose monitoring     Checks 6 times daily      Uses dexcom G6     See below       Review of Systems - General ROS: negative      Objective   Vital Signs:   /80   Pulse 77   Ht 167.6 cm (66\")   Wt 67.7 kg (149 lb 3.2 oz)   SpO2 99%   BMI 24.08 kg/m²     Physical Exam  Constitutional:       Appearance: Normal appearance.   Cardiovascular:      Rate and Rhythm: Regular rhythm.      Heart sounds: Normal heart sounds.   Pulmonary:      Breath sounds: Normal breath sounds.   Musculoskeletal:      Cervical back: Normal range of motion.   Neurological:      Mental Status: She is alert.      Result Review :   The following data was reviewed by: LILLIE Esquivel on 05/04/2022:  Common labs          11/22/2022    09:28 11/22/2022    09:59 2/27/2023    09:03   Common Labs   Glucose 141   310    BUN 13   13    Creatinine 0.74   0.79    Sodium 140   139    Potassium 4.2   4.3    Chloride 104   104    Calcium 9.4   8.8    Albumin 4.40   4.2    Total Bilirubin 0.3   0.2    Alkaline Phosphatase 93   75    AST (SGOT) 27   15    ALT (SGPT) 19   10    WBC 10.28   9.47    Hemoglobin 14.0   13.4    Hematocrit 41.8   39.7    Platelets 295   322    Hemoglobin A1C 9.00   9.30    Microalbumin, Urine  <1.2               Assessment and Plan    Diagnoses and all orders for this visit:    1. Pain in both feet (Primary)  -     Ambulatory Referral to Podiatry    2. Type 1 diabetes mellitus with hyperglycemia    3. Smoker    4. Vitamin D deficiency    5. Diabetic autonomic neuropathy associated with type 1 diabetes mellitus  -   "   gabapentin (Neurontin) 100 MG capsule; Take 1 capsule by mouth 3 (Three) Times a Day.  Dispense: 90 capsule; Refill: 3           Glycemic management     Diabetes mellitus type 1        Lab Results   Component Value Date    HGBA1C 9.30 (H) 02/27/2023     Component      Latest Ref Rng & Units 5/17/2019   Islet Cell Ab      Neg:<1:1 Negative   BRENDA-65      0.0 - 5.0 U/mL 2607.1 (H)   C-Peptide      1.1 - 4.4 ng/mL 0.6 (L)          Dexcom G6-        Ambulatory Glucose Profile Report    Days Analyzed : 2 week period ending on 06/15/23      Continuous Glucose Monitory Device:  Dexcom G6     29% very high target range  46% high target range  24% in target range  1% below target range  GMI 8.7 %  Average glucose 225 mg/dl    Interpretation : Diabetes Type 1 Uncontrolled     Hyperglycemia     She is stress eating she states, she Is having depression     We changed target     On Omnipod 5               omni pod --       Basal     MN   0.8         Carb ratio       11      Correction       50     Target bg       150--changed to 140                   Previous regimen           Basaglar 24 units daily --stopped today     On work days will take 20 units               Novolog ----   ---      1 unit per 10 grams of CHO on off day s     Work days do 1 unit per 15 grams of CHO         Call in baqsimi            Lipid Management        Patient is not on a statin                      Total Cholesterol   Date Value Ref Range Status   03/03/2021 196 0 - 200 mg/dL Final                Triglycerides   Date Value Ref Range Status   03/03/2021 91 0 - 150 mg/dL Final                HDL Cholesterol   Date Value Ref Range Status   03/03/2021 42 40 - 60 mg/dL Final                LDL Cholesterol    Date Value Ref Range Status   03/03/2021 137 (H) 0 - 100 mg/dL Final         Blood Pressure Management     bp controlled not on ACEi               Microvascular Complication Monitoring        May 2018         microalbuminuria      Ratio - 33 High           neuropathy yes        Restart gabapentin 100 mg tid               Bone Health        Component      Latest Ref Rng & Units 9/3/2020   25 Hydroxy, Vitamin D      30.0 - 100.0 ng/ml 32.2       vitamin d def.        Taking vitamin d supplement            Other Diabetes Related Aspects        Lab Results   Component Value Date    [id number] 989 (H) 02/27/2023             Thyroid Health     Lab Results   Component Value Date    TSH 0.436 02/27/2023             Weight Management:    Normal weight        Body mass index is 24.08 kg/m².                  Preventive Care:     Patient is smoking      Ella Chery Jarod  reports that she has been smoking cigarettes. She has never used smokeless tobacco.. I have educated her on the risk of diseases from using tobacco products such as cancer, COPD, and heart disease.     I advised her to quit and she is not willing to quit.    I spent 3  minutes counseling the patient.              Referral to podiatry -- for bilateral foot pain              Follow Up   No follow-ups on file.  Patient was given instructions and counseling regarding her condition or for health maintenance advice. Please see specific information pulled into the AVS if appropriate.         This document has been electronically signed by LILLIE Esquivel on Nataly 15, 2023 10:32 CDT.

## 2023-08-22 ENCOUNTER — TELEPHONE (OUTPATIENT)
Dept: ENDOCRINOLOGY | Facility: CLINIC | Age: 33
End: 2023-08-22
Payer: MEDICAID

## 2023-08-22 NOTE — TELEPHONE ENCOUNTER
Ms. Olivera called and was needing documentation that she is a Type I diabetic.  This is for college.  This is for her to be able to get Finical aid (FASA).  When Covid hit, she had to withdrawal and now they are needing this information so she can get back into college.  Need as soon as possible please.     If able to do this, please contact pt so that she can come back and .    Pt [phone]

## 2023-08-22 NOTE — TELEPHONE ENCOUNTER
Called to clarify what exactly Ella is needing for the college. She stated that she just needs a letter stating that she is a Type 1 diabetic. She said there is nothing specific that has to be mentioned.

## 2023-09-07 ENCOUNTER — TELEPHONE (OUTPATIENT)
Dept: ENDOCRINOLOGY | Facility: CLINIC | Age: 33
End: 2023-09-07
Payer: MEDICAID

## 2023-09-07 NOTE — TELEPHONE ENCOUNTER
Elal wanted to clarify how much long acting insulin she would need in the case her pump malfunctioned.

## 2023-09-07 NOTE — TELEPHONE ENCOUNTER
Ella wanted to clarify how much long acting insulin she would need in the case her pump malfunctioned.

## 2023-09-07 NOTE — TELEPHONE ENCOUNTER
Ms. Ella Olivera called and she has some questions concerning her pump and medication.  She is going on vacation and has some questions in case the pump malfunctions.     Pt call back [phone]

## 2023-09-27 ENCOUNTER — OFFICE VISIT (OUTPATIENT)
Dept: ENDOCRINOLOGY | Facility: CLINIC | Age: 33
End: 2023-09-27
Payer: MEDICAID

## 2023-09-27 VITALS
OXYGEN SATURATION: 99 % | BODY MASS INDEX: 23 KG/M2 | SYSTOLIC BLOOD PRESSURE: 100 MMHG | WEIGHT: 143.1 LBS | DIASTOLIC BLOOD PRESSURE: 72 MMHG | HEART RATE: 72 BPM | HEIGHT: 66 IN

## 2023-09-27 DIAGNOSIS — E10.42 DIABETIC POLYNEUROPATHY ASSOCIATED WITH TYPE 1 DIABETES MELLITUS: ICD-10-CM

## 2023-09-27 DIAGNOSIS — F17.200 SMOKER: ICD-10-CM

## 2023-09-27 DIAGNOSIS — E10.65 TYPE 1 DIABETES MELLITUS WITH HYPERGLYCEMIA: Primary | ICD-10-CM

## 2023-09-27 PROCEDURE — 1160F RVW MEDS BY RX/DR IN RCRD: CPT | Performed by: NURSE PRACTITIONER

## 2023-09-27 PROCEDURE — 3046F HEMOGLOBIN A1C LEVEL >9.0%: CPT | Performed by: NURSE PRACTITIONER

## 2023-09-27 PROCEDURE — 95251 CONT GLUC MNTR ANALYSIS I&R: CPT | Performed by: NURSE PRACTITIONER

## 2023-09-27 PROCEDURE — 1159F MED LIST DOCD IN RCRD: CPT | Performed by: NURSE PRACTITIONER

## 2023-09-27 PROCEDURE — 99214 OFFICE O/P EST MOD 30 MIN: CPT | Performed by: NURSE PRACTITIONER

## 2023-09-27 RX ORDER — DEXTROAMPHETAMINE/AMPHETAMINE 10 MG
10 CAPSULE, EXT RELEASE 24 HR ORAL EVERY MORNING
COMMUNITY
Start: 2023-08-21

## 2023-09-27 NOTE — PROGRESS NOTES
"Chief Complaint  Diabetes (Dexcom and omnipod)    Subjective          Ella Olivera presents to [facility] ENDOCRINOLOGY  Diabetes       In office visit     33 year old presents for follow up     Diabetes mellitus type 1     Diagnosed in 2017     Timing constant     Quality not controlled    Severity moderate     Complications neuropathy         Current regimen     Insulin     Uses omnipod 5     Glucose monitoring     Checks 6 times daily      Uses dexcom G6     See below for download           Review of Systems - General ROS: negative      Objective   Vital Signs:   /72   Pulse 72   Ht 167.6 cm (66\")   Wt 64.9 kg (143 lb 1.6 oz)   SpO2 99%   BMI 23.10 kg/m²     Physical Exam  Constitutional:       Appearance: Normal appearance.   Cardiovascular:      Rate and Rhythm: Regular rhythm.      Heart sounds: Normal heart sounds.   Pulmonary:      Breath sounds: Normal breath sounds.   Musculoskeletal:      Cervical back: Normal range of motion.   Neurological:      Mental Status: She is alert.      Result Review :   The following data was reviewed by: LILLIE Esquivel on 05/04/2022:  Common labs          11/22/2022    09:28 11/22/2022    09:59 2/27/2023    09:03   Common Labs   Glucose 141   310    BUN 13   13    Creatinine 0.74   0.79    Sodium 140   139    Potassium 4.2   4.3    Chloride 104   104    Calcium 9.4   8.8    Albumin 4.40   4.2    Total Bilirubin 0.3   0.2    Alkaline Phosphatase 93   75    AST (SGOT) 27   15    ALT (SGPT) 19   10    WBC 10.28   9.47    Hemoglobin 14.0   13.4    Hematocrit 41.8   39.7    Platelets 295   322    Hemoglobin A1C 9.00   9.30    Microalbumin, Urine  <1.2               Assessment and Plan    Diagnoses and all orders for this visit:    1. Type 1 diabetes mellitus with hyperglycemia (Primary)    2. Diabetic polyneuropathy associated with type 1 diabetes mellitus    3. Smoker    Other orders  -     Insulin Glargine (LANTUS " SOLOSTAR) 100 UNIT/ML injection pen; Inject 24 Units under the skin into the appropriate area as directed Daily.  Dispense: 15 mL; Refill: 11           Glycemic management     Diabetes mellitus type 1        Lab Results   Component Value Date    HGBA1C 9.30 (H) 02/27/2023     Component      Latest Ref Rng & Units 5/17/2019   Islet Cell Ab      Neg:<1:1 Negative   BRENDA-65      0.0 - 5.0 U/mL 2607.1 (H)   C-Peptide      1.1 - 4.4 ng/mL 0.6 (L)          Dexcom G6-        Ambulatory Glucose Profile Report    Days Analyzed : 2 week period ending on 09/27/23      Continuous Glucose Monitory Device:  Dexcom G6     24% very high target range  37% high target range  38% in target range  1% below target range  GMI 8.2 %  Average glucose 206 mg/dl    Interpretation : Diabetes Type 1 Uncontrolled       Hyperglycemia most of the time         She boluses late and running  high           She did have a low with drinking dr. Ash         On Omnipod 5               omni pod --       Basal     MN   0.8         Carb ratio       11      Correction       50     Target bg       150--changed to 140                   Previous regimen           Basaglar 24 units daily --stopped today     On work days will take 20 units               Novolog ----   ---      1 unit per 10 grams of CHO on off day s     Work days do 1 unit per 15 grams of CHO         Call in baqsimi            Lipid Management        Patient is not on a statin                      Total Cholesterol   Date Value Ref Range Status   03/03/2021 196 0 - 200 mg/dL Final                Triglycerides   Date Value Ref Range Status   03/03/2021 91 0 - 150 mg/dL Final                HDL Cholesterol   Date Value Ref Range Status   03/03/2021 42 40 - 60 mg/dL Final                LDL Cholesterol    Date Value Ref Range Status   03/03/2021 137 (H) 0 - 100 mg/dL Final         Blood Pressure Management     bp controlled not on ACEi               Microvascular Complication Monitoring         May 2018         microalbuminuria      Ratio - 33 High          neuropathy yes        gabapentin 100 mg tid               Bone Health        Component      Latest Ref Rng & Units 9/3/2020   25 Hydroxy, Vitamin D      30.0 - 100.0 ng/ml 32.2       vitamin d def.        Taking vitamin d supplement            Other Diabetes Related Aspects        Lab Results   Component Value Date    [id number] 989 (H) 02/27/2023             Thyroid Health     Lab Results   Component Value Date    TSH 0.436 02/27/2023             Weight Management:    Normal weight        Body mass index is 23.1 kg/m².                  Preventive Care:     Patient is smoking      Ella Chery Olivera  reports that she has been smoking cigarettes. She has never used smokeless tobacco.. I have educated her on the risk of diseases from using tobacco products such as cancer, COPD, and heart disease.     I advised her to quit and she is not willing to quit.    I spent 3  minutes counseling the patient.                   Follow Up   Return in about 3 months (around 12/27/2023) for Recheck.  Patient was given instructions and counseling regarding her condition or for health maintenance advice. Please see specific information pulled into the AVS if appropriate.         This document has been electronically signed by LILLIE Esquivel on September 27, 2023 10:58 CDT.